# Patient Record
Sex: FEMALE | Race: WHITE | Employment: OTHER | URBAN - METROPOLITAN AREA
[De-identification: names, ages, dates, MRNs, and addresses within clinical notes are randomized per-mention and may not be internally consistent; named-entity substitution may affect disease eponyms.]

---

## 2017-03-09 PROBLEM — H61.23 BILATERAL IMPACTED CERUMEN: Status: ACTIVE | Noted: 2017-03-09

## 2017-03-09 PROBLEM — M25.511 ACUTE PAIN OF RIGHT SHOULDER: Status: ACTIVE | Noted: 2017-03-09

## 2017-04-19 PROBLEM — M81.0 OSTEOPOROSIS: Status: ACTIVE | Noted: 2017-04-19

## 2017-09-14 PROBLEM — D48.9 NEOPLASM OF UNCERTAIN BEHAVIOR: Status: ACTIVE | Noted: 2017-09-14

## 2017-10-17 PROBLEM — M79.671 RIGHT FOOT PAIN: Status: ACTIVE | Noted: 2017-10-17

## 2017-10-17 PROBLEM — M25.511 ACUTE PAIN OF RIGHT SHOULDER: Status: RESOLVED | Noted: 2017-03-09 | Resolved: 2017-10-17

## 2018-01-16 PROBLEM — R20.2 RIGHT HAND PARESTHESIA: Status: ACTIVE | Noted: 2018-01-16

## 2018-01-16 PROBLEM — M41.34 THORACOGENIC SCOLIOSIS OF THORACIC REGION: Status: ACTIVE | Noted: 2018-01-16

## 2018-02-02 ENCOUNTER — HOSPITAL ENCOUNTER (OUTPATIENT)
Dept: MAMMOGRAPHY | Age: 73
Discharge: HOME | End: 2018-02-02
Attending: OBSTETRICS & GYNECOLOGY
Payer: MEDICARE

## 2018-02-02 DIAGNOSIS — M81.0 OSTEOPOROSIS, UNSPECIFIED OSTEOPOROSIS TYPE, UNSPECIFIED PATHOLOGICAL FRACTURE PRESENCE: ICD-10-CM

## 2018-02-02 PROCEDURE — 77080 DXA BONE DENSITY AXIAL: CPT

## 2018-02-05 NOTE — PROGRESS NOTES
IN spite of all these years of evista and kickboxing she is still has osteoporosis in the L femur. I'd recommend stopping evista and getting her set up with Q6 mo prolia injections if she is agreeable. Please let me know, thanks.

## 2018-02-13 PROBLEM — M79.671 RIGHT FOOT PAIN: Status: RESOLVED | Noted: 2017-10-17 | Resolved: 2018-02-13

## 2018-02-13 PROBLEM — Z12.11 SCREEN FOR COLON CANCER: Status: ACTIVE | Noted: 2018-02-13

## 2018-04-16 ENCOUNTER — HOSPITAL ENCOUNTER (OUTPATIENT)
Dept: LAB | Age: 73
Discharge: HOME | End: 2018-04-16

## 2018-04-16 PROCEDURE — 88305 TISSUE EXAM BY PATHOLOGIST: CPT | Performed by: INTERNAL MEDICINE

## 2018-04-17 PROBLEM — Z00.00 ROUTINE GENERAL MEDICAL EXAMINATION AT A HEALTH CARE FACILITY: Status: ACTIVE | Noted: 2018-04-17

## 2018-04-17 PROBLEM — J41.0 SIMPLE CHRONIC BRONCHITIS (HCC): Status: ACTIVE | Noted: 2018-04-17

## 2018-04-17 PROBLEM — M15.9 PRIMARY OSTEOARTHRITIS INVOLVING MULTIPLE JOINTS: Status: ACTIVE | Noted: 2018-04-17

## 2018-10-17 PROBLEM — M25.552 LEFT HIP PAIN: Status: ACTIVE | Noted: 2018-10-17

## 2018-10-17 PROBLEM — R79.89 LOW VITAMIN D LEVEL: Status: ACTIVE | Noted: 2018-10-17

## 2018-10-22 ENCOUNTER — HOSPITAL ENCOUNTER (OUTPATIENT)
Dept: GENERAL RADIOLOGY | Age: 73
Discharge: HOME | End: 2018-10-22
Attending: FAMILY MEDICINE
Payer: MEDICARE

## 2018-10-22 DIAGNOSIS — M25.552 LEFT HIP PAIN: ICD-10-CM

## 2018-10-22 PROCEDURE — 73502 X-RAY EXAM HIP UNI 2-3 VIEWS: CPT

## 2018-11-15 PROBLEM — J30.9 ALLERGIC RHINITIS: Status: ACTIVE | Noted: 2018-11-15

## 2018-11-15 PROBLEM — B00.1 HERPES LABIALIS: Status: ACTIVE | Noted: 2018-11-15

## 2019-02-11 PROBLEM — J44.1 COPD EXACERBATION (HCC): Status: ACTIVE | Noted: 2019-02-11

## 2019-05-01 PROBLEM — S81.812A LACERATION OF LEFT LOWER EXTREMITY: Status: ACTIVE | Noted: 2019-05-01

## 2019-09-20 PROBLEM — L82.1 SEBORRHEIC KERATOSIS: Status: ACTIVE | Noted: 2019-09-20

## 2019-09-24 PROBLEM — Z12.11 SCREEN FOR COLON CANCER: Status: RESOLVED | Noted: 2018-02-13 | Resolved: 2019-09-24

## 2019-09-24 PROBLEM — Z00.00 ROUTINE GENERAL MEDICAL EXAMINATION AT A HEALTH CARE FACILITY: Status: RESOLVED | Noted: 2018-04-17 | Resolved: 2019-09-24

## 2019-10-30 PROBLEM — I10 ESSENTIAL HYPERTENSION: Status: ACTIVE | Noted: 2019-10-30

## 2020-07-22 PROBLEM — E55.9 VITAMIN D DEFICIENCY: Status: ACTIVE | Noted: 2018-10-17

## 2020-08-21 PROBLEM — Z00.00 ENCOUNTER FOR MEDICARE ANNUAL WELLNESS EXAM: Status: RESOLVED | Noted: 2018-04-17 | Resolved: 2020-08-21

## 2021-02-26 ENCOUNTER — HOSPITAL ENCOUNTER (OUTPATIENT)
Dept: MAMMOGRAPHY | Age: 76
Discharge: HOME | End: 2021-02-26
Attending: FAMILY MEDICINE
Payer: MEDICARE

## 2021-02-26 DIAGNOSIS — Z12.31 SCREENING MAMMOGRAM, ENCOUNTER FOR: ICD-10-CM

## 2021-02-26 PROCEDURE — 77067 SCR MAMMO BI INCL CAD: CPT

## 2021-05-06 PROBLEM — R91.8 PULMONARY NODULES: Status: ACTIVE | Noted: 2021-05-06

## 2021-05-06 PROBLEM — R53.82 CHRONIC FATIGUE: Status: ACTIVE | Noted: 2021-05-06

## 2021-05-14 ENCOUNTER — HOSPITAL ENCOUNTER (OUTPATIENT)
Dept: CT IMAGING | Age: 76
Discharge: HOME | End: 2021-05-14
Attending: FAMILY MEDICINE

## 2021-05-14 DIAGNOSIS — R91.8 PULMONARY NODULES: ICD-10-CM

## 2021-05-14 RX ORDER — SODIUM CHLORIDE 0.9 % (FLUSH) 0.9 %
10 SYRINGE (ML) INJECTION
Status: COMPLETED | OUTPATIENT
Start: 2021-05-14 | End: 2021-05-14

## 2021-05-14 RX ADMIN — Medication 10 ML: at 09:24

## 2021-08-03 PROBLEM — I10 HYPERTENSION: Status: RESOLVED | Noted: 2021-08-03 | Resolved: 2021-08-03

## 2022-03-18 PROBLEM — M81.0 OSTEOPOROSIS: Status: ACTIVE | Noted: 2017-04-19

## 2022-03-18 PROBLEM — J44.1 COPD EXACERBATION (HCC): Status: ACTIVE | Noted: 2019-02-11

## 2022-03-18 PROBLEM — R91.8 PULMONARY NODULES: Status: ACTIVE | Noted: 2021-05-06

## 2022-03-19 PROBLEM — R53.82 CHRONIC FATIGUE: Status: ACTIVE | Noted: 2021-05-06

## 2022-03-19 PROBLEM — M15.9 PRIMARY OSTEOARTHRITIS INVOLVING MULTIPLE JOINTS: Status: ACTIVE | Noted: 2018-04-17

## 2022-03-19 PROBLEM — M15.0 PRIMARY OSTEOARTHRITIS INVOLVING MULTIPLE JOINTS: Status: ACTIVE | Noted: 2018-04-17

## 2022-03-19 PROBLEM — L82.1 SEBORRHEIC KERATOSIS: Status: ACTIVE | Noted: 2019-09-20

## 2022-03-19 PROBLEM — S81.812A LACERATION OF LEFT LOWER EXTREMITY: Status: ACTIVE | Noted: 2019-05-01

## 2022-03-19 PROBLEM — R20.2 RIGHT HAND PARESTHESIA: Status: ACTIVE | Noted: 2018-01-16

## 2022-03-19 PROBLEM — I10 ESSENTIAL HYPERTENSION: Status: ACTIVE | Noted: 2019-10-30

## 2022-03-19 PROBLEM — B00.1 HERPES LABIALIS: Status: ACTIVE | Noted: 2018-11-15

## 2022-03-19 PROBLEM — D48.9 NEOPLASM OF UNCERTAIN BEHAVIOR: Status: ACTIVE | Noted: 2017-09-14

## 2022-03-19 PROBLEM — M25.552 LEFT HIP PAIN: Status: ACTIVE | Noted: 2018-10-17

## 2022-03-19 PROBLEM — H61.23 BILATERAL IMPACTED CERUMEN: Status: ACTIVE | Noted: 2017-03-09

## 2022-03-19 PROBLEM — J41.0 SIMPLE CHRONIC BRONCHITIS (HCC): Status: ACTIVE | Noted: 2018-04-17

## 2022-03-19 PROBLEM — J30.9 ALLERGIC RHINITIS: Status: ACTIVE | Noted: 2018-11-15

## 2022-03-19 PROBLEM — M41.34 THORACOGENIC SCOLIOSIS OF THORACIC REGION: Status: ACTIVE | Noted: 2018-01-16

## 2022-03-20 PROBLEM — E55.9 VITAMIN D DEFICIENCY: Status: ACTIVE | Noted: 2018-10-17

## 2023-08-28 ENCOUNTER — PROCEDURE VISIT (OUTPATIENT)
Dept: UROLOGY | Age: 78
End: 2023-08-28
Payer: COMMERCIAL

## 2023-08-28 DIAGNOSIS — C67.9 MALIGNANT NEOPLASM OF URINARY BLADDER, UNSPECIFIED SITE (HCC): Primary | ICD-10-CM

## 2023-08-28 LAB
BILIRUBIN, URINE, POC: NEGATIVE
BLOOD URINE, POC: NEGATIVE
GLUCOSE URINE, POC: NEGATIVE
KETONES, URINE, POC: NEGATIVE
LEUKOCYTE ESTERASE, URINE, POC: NORMAL
NITRITE, URINE, POC: NEGATIVE
PH, URINE, POC: 5.5 (ref 4.6–8)
PROTEIN,URINE, POC: NEGATIVE
SPECIFIC GRAVITY, URINE, POC: 1.01 (ref 1–1.03)
URINALYSIS CLARITY, POC: NORMAL
URINALYSIS COLOR, POC: NORMAL
UROBILINOGEN, POC: NORMAL

## 2023-08-28 PROCEDURE — 52000 CYSTOURETHROSCOPY: CPT | Performed by: UROLOGY

## 2023-08-28 PROCEDURE — 81003 URINALYSIS AUTO W/O SCOPE: CPT | Performed by: UROLOGY

## 2023-08-28 NOTE — PROGRESS NOTES
Smoking status: Every Day     Packs/day: 1.00     Types: Cigarettes    Smokeless tobacco: Never   Substance and Sexual Activity    Alcohol use: Yes     Alcohol/week: 1.7 standard drinks    Drug use: No    Sexual activity: Not on file   Other Topics Concern    Not on file   Social History Narrative    Lives with      Social Determinants of Health     Financial Resource Strain: Not on file   Food Insecurity: Not on file   Transportation Needs: Not on file   Physical Activity: Not on file   Stress: Not on file   Social Connections: Not on file   Intimate Partner Violence: Not on file   Housing Stability: Not on file     Family History   Problem Relation Age of Onset    Heart Disease Father     Cancer Maternal Aunt         breast    Cancer Other         breast    Emphysema Mother     Breast Cancer Maternal Aunt 64       There were no vitals taken for this visit. UA - Dipstick  Results for orders placed or performed in visit on 08/28/23   AMB POC URINALYSIS DIP STICK AUTO W/O MICRO   Result Value Ref Range    Color (UA POC)      Clarity (UA POC)      Glucose, Urine, POC Negative Negative    Bilirubin, Urine, POC Negative Negative    KETONES, Urine, POC Negative Negative    Specific Gravity, Urine, POC 1.015 1.001 - 1.035    Blood (UA POC) Negative Negative    pH, Urine, POC 5.5 4.6 - 8.0    Protein, Urine, POC Negative Negative    Urobilinogen, POC Normal     Nitrite, Urine, POC Negative Negative    Leukocyte Esterase, Urine, POC Trace Negative       UA - Micro  WBC - 0  RBC - 0  Bacteria - 0  Epith - 0      Cystoscopy Procedure:     Procedure Room  1  Scope ID:       disposable   Assistant:      kamilah    All risks, benefits and alternatives were again reviewed with patient and she is willing to proceed at this time. Her genital area was prepped and draped and a sterile field applied. 2% lidocaine jelly was injected in the the urethra and allowed to dwell for several minutes.   A flexible cystoscope was then

## 2024-10-10 ENCOUNTER — HOSPITAL ENCOUNTER (EMERGENCY)
Age: 79
Discharge: HOME | End: 2024-10-10
Attending: EMERGENCY MEDICINE
Payer: MEDICARE

## 2024-10-10 VITALS
TEMPERATURE: 98.5 F | HEART RATE: 81 BPM | RESPIRATION RATE: 16 BRPM | HEIGHT: 62 IN | OXYGEN SATURATION: 92 % | DIASTOLIC BLOOD PRESSURE: 61 MMHG | BODY MASS INDEX: 21.16 KG/M2 | SYSTOLIC BLOOD PRESSURE: 98 MMHG | WEIGHT: 115 LBS

## 2024-10-10 DIAGNOSIS — R53.83 FATIGUE, UNSPECIFIED TYPE: Primary | ICD-10-CM

## 2024-10-10 LAB
ALBUMIN SERPL-MCNC: 3.1 G/DL (ref 3.2–4.6)
ALBUMIN/GLOB SERPL: 0.9 (ref 1–1.9)
ALP SERPL-CCNC: 57 U/L (ref 35–104)
ALT SERPL-CCNC: 8 U/L (ref 8–45)
ANION GAP SERPL CALC-SCNC: 10 MMOL/L (ref 9–18)
APPEARANCE UR: CLEAR
AST SERPL-CCNC: 19 U/L (ref 15–37)
BASOPHILS # BLD: 0 K/UL (ref 0–0.2)
BASOPHILS NFR BLD: 1 % (ref 0–2)
BILIRUB SERPL-MCNC: <0.2 MG/DL (ref 0–1.2)
BILIRUB UR QL: NEGATIVE
BUN SERPL-MCNC: 35 MG/DL (ref 8–23)
CALCIUM SERPL-MCNC: 9 MG/DL (ref 8.8–10.2)
CHLORIDE SERPL-SCNC: 98 MMOL/L (ref 98–107)
CO2 SERPL-SCNC: 29 MMOL/L (ref 20–28)
COLOR UR: NORMAL
CREAT SERPL-MCNC: 1.26 MG/DL (ref 0.6–1.1)
DIFFERENTIAL METHOD BLD: ABNORMAL
EOSINOPHIL # BLD: 0.1 K/UL (ref 0–0.8)
EOSINOPHIL NFR BLD: 1 % (ref 0.5–7.8)
ERYTHROCYTE [DISTWIDTH] IN BLOOD BY AUTOMATED COUNT: 12.2 % (ref 11.9–14.6)
GLOBULIN SER CALC-MCNC: 3.3 G/DL (ref 2.3–3.5)
GLUCOSE SERPL-MCNC: 117 MG/DL (ref 70–99)
GLUCOSE UR STRIP.AUTO-MCNC: NEGATIVE MG/DL
HCT VFR BLD AUTO: 34.7 % (ref 35.8–46.3)
HGB BLD-MCNC: 10.8 G/DL (ref 11.7–15.4)
HGB UR QL STRIP: NEGATIVE
IMM GRANULOCYTES # BLD AUTO: 0 K/UL (ref 0–0.5)
IMM GRANULOCYTES NFR BLD AUTO: 0 % (ref 0–5)
KETONES UR QL STRIP.AUTO: NEGATIVE MG/DL
LEUKOCYTE ESTERASE UR QL STRIP.AUTO: NEGATIVE
LYMPHOCYTES # BLD: 0.8 K/UL (ref 0.5–4.6)
LYMPHOCYTES NFR BLD: 12 % (ref 13–44)
MAGNESIUM SERPL-MCNC: 2.4 MG/DL (ref 1.8–2.4)
MCH RBC QN AUTO: 30.3 PG (ref 26.1–32.9)
MCHC RBC AUTO-ENTMCNC: 31.1 G/DL (ref 31.4–35)
MCV RBC AUTO: 97.2 FL (ref 82–102)
MONOCYTES # BLD: 0.7 K/UL (ref 0.1–1.3)
MONOCYTES NFR BLD: 11 % (ref 4–12)
NEUTS SEG # BLD: 5 K/UL (ref 1.7–8.2)
NEUTS SEG NFR BLD: 76 % (ref 43–78)
NITRITE UR QL STRIP.AUTO: NEGATIVE
NRBC # BLD: 0 K/UL (ref 0–0.2)
PH UR STRIP: 5 (ref 5–9)
PLATELET # BLD AUTO: 390 K/UL (ref 150–450)
PMV BLD AUTO: 8.7 FL (ref 9.4–12.3)
POTASSIUM SERPL-SCNC: 4.5 MMOL/L (ref 3.5–5.1)
PROT SERPL-MCNC: 6.4 G/DL (ref 6.3–8.2)
PROT UR STRIP-MCNC: NEGATIVE MG/DL
RBC # BLD AUTO: 3.57 M/UL (ref 4.05–5.2)
SODIUM SERPL-SCNC: 137 MMOL/L (ref 136–145)
SP GR UR REFRACTOMETRY: 1.01 (ref 1–1.02)
UROBILINOGEN UR QL STRIP.AUTO: 0.2 EU/DL (ref 0.2–1)
WBC # BLD AUTO: 6.6 K/UL (ref 4.3–11.1)

## 2024-10-10 PROCEDURE — 80053 COMPREHEN METABOLIC PANEL: CPT

## 2024-10-10 PROCEDURE — 85025 COMPLETE CBC W/AUTO DIFF WBC: CPT

## 2024-10-10 PROCEDURE — 99284 EMERGENCY DEPT VISIT MOD MDM: CPT

## 2024-10-10 PROCEDURE — 83735 ASSAY OF MAGNESIUM: CPT

## 2024-10-10 PROCEDURE — 81003 URINALYSIS AUTO W/O SCOPE: CPT

## 2024-10-10 ASSESSMENT — PAIN - FUNCTIONAL ASSESSMENT: PAIN_FUNCTIONAL_ASSESSMENT: NONE - DENIES PAIN

## 2024-10-10 ASSESSMENT — LIFESTYLE VARIABLES
HOW MANY STANDARD DRINKS CONTAINING ALCOHOL DO YOU HAVE ON A TYPICAL DAY: PATIENT DOES NOT DRINK
HOW OFTEN DO YOU HAVE A DRINK CONTAINING ALCOHOL: NEVER

## 2024-10-10 ASSESSMENT — ENCOUNTER SYMPTOMS
SHORTNESS OF BREATH: 0
NAUSEA: 0
VOMITING: 0
COUGH: 0

## 2024-10-10 NOTE — DISCHARGE INSTRUCTIONS
As we discussed, did not find the exact cause of your symptoms today in the Emergency Department but we did not find anything urgent or emergent.  Therefore, I will encourage you to follow-up with a primary care doctor for reevaluation.    Please return with any fevers, vomiting, coughing, difficulty breathing, worsening symptoms, or additional concerns.    To help you get a primary care doctor for follow-up after your emergency department visit, please call [phone] between 7AM - 6PM Monday to Friday.  A care navigator will be able to assist you with setting up a doctor close to your home.

## 2024-10-10 NOTE — ED PROVIDER NOTES
18 mmol/L    Glucose 117 (H) 70 - 99 mg/dL    BUN 35 (H) 8 - 23 MG/DL    Creatinine 1.26 (H) 0.60 - 1.10 MG/DL    Est, Glom Filt Rate 44 (L) >60 ml/min/1.73m2    Calcium 9.0 8.8 - 10.2 MG/DL    Total Bilirubin <0.2 0.0 - 1.2 MG/DL    ALT 8 8 - 45 U/L    AST 19 15 - 37 U/L    Alk Phosphatase 57 35 - 104 U/L    Total Protein 6.4 6.3 - 8.2 g/dL    Albumin 3.1 (L) 3.2 - 4.6 g/dL    Globulin 3.3 2.3 - 3.5 g/dL    Albumin/Globulin Ratio 0.9 (L) 1.0 - 1.9     Magnesium   Result Value Ref Range    Magnesium 2.4 1.8 - 2.4 mg/dL   Urinalysis   Result Value Ref Range    Color, UA YELLOW/STRAW      Appearance CLEAR      Specific Gravity, UA 1.013 1.001 - 1.023      pH, Urine 5.0 5.0 - 9.0      Protein, UA Negative NEG mg/dL    Glucose, Ur Negative NEG mg/dL    Ketones, Urine Negative NEG mg/dL    Bilirubin, Urine Negative NEG      Blood, Urine Negative NEG      Urobilinogen, Urine 0.2 0.2 - 1.0 EU/dL    Nitrite, Urine Negative NEG      Leukocyte Esterase, Urine Negative NEG           No orders to display                No results for input(s): \"COVID19\" in the last 72 hours.    Voice dictation software was used during the making of this note.  This software is not perfect and grammatical and other typographical errors may be present.  This note has not been completely proofread for errors.        Jackson Samayoa MD  10/10/24 4773

## 2024-10-10 NOTE — DISCHARGE INSTR - COC
List:716259073}    Routes of Feeding: {CHP DME Other Feedings:449466813}  Liquids: {Slp liquid thickness:84623}  Daily Fluid Restriction: {CHP DME Yes amt example:372982175}  Last Modified Barium Swallow with Video (Video Swallowing Test): {Done Not Done Date:}    Treatments at the Time of Hospital Discharge:   Respiratory Treatments: ***  Oxygen Therapy:  {Therapy; copd oxygen:03194}  Ventilator:    { CC Vent List:911249416}    Rehab Therapies: {THERAPEUTIC INTERVENTION:0555058755}  Weight Bearing Status/Restrictions: { CC Weight Bearin}  Other Medical Equipment (for information only, NOT a DME order):  {EQUIPMENT:623643003}  Other Treatments: ***    Patient's personal belongings (please select all that are sent with patient):  {CHP DME Belongings:299711007}    RN SIGNATURE:  {Esignature:289851626}    CASE MANAGEMENT/SOCIAL WORK SECTION    Inpatient Status Date: ***    Readmission Risk Assessment Score:  Readmission Risk              Risk of Unplanned Readmission:  0           Discharging to Facility/ Agency   Name:   Address:  Phone:  Fax:    Dialysis Facility (if applicable)   Name:  Address:  Dialysis Schedule:  Phone:  Fax:    / signature: {Esignature:494737500}    PHYSICIAN SECTION    Prognosis: {Prognosis:1450599905}    Condition at Discharge: { Patient Condition:566776031}    Rehab Potential (if transferring to Rehab): {Prognosis:5819043025}    Recommended Labs or Other Treatments After Discharge: ***    Physician Certification: I certify the above information and transfer of Madai Tinoco  is necessary for the continuing treatment of the diagnosis listed and that she requires {Admit to Appropriate Level of Care:50111} for {GREATER/LESS:220783500} 30 days.     Update Admission H&P: {CHP DME Changes in HandP:264965513}    PHYSICIAN SIGNATURE:  {Esignature:307964796}

## 2024-10-21 ENCOUNTER — APPOINTMENT (OUTPATIENT)
Dept: GENERAL RADIOLOGY | Age: 79
DRG: 378 | End: 2024-10-21
Payer: MEDICARE

## 2024-10-21 ENCOUNTER — HOSPITAL ENCOUNTER (INPATIENT)
Age: 79
LOS: 3 days | Discharge: HOME | DRG: 378 | End: 2024-10-24
Attending: EMERGENCY MEDICINE | Admitting: FAMILY MEDICINE
Payer: MEDICARE

## 2024-10-21 DIAGNOSIS — K92.2 GASTROINTESTINAL HEMORRHAGE, UNSPECIFIED GASTROINTESTINAL HEMORRHAGE TYPE: Primary | ICD-10-CM

## 2024-10-21 PROBLEM — I48.0 PAROXYSMAL ATRIAL FIBRILLATION (HCC): Status: ACTIVE | Noted: 2023-03-01

## 2024-10-21 PROBLEM — F32.A DEPRESSION: Status: ACTIVE | Noted: 2023-01-19

## 2024-10-21 PROBLEM — G47.33 OSA (OBSTRUCTIVE SLEEP APNEA): Status: ACTIVE | Noted: 2024-09-20

## 2024-10-21 PROBLEM — J43.2 CENTRILOBULAR EMPHYSEMA (HCC): Status: ACTIVE | Noted: 2021-07-13

## 2024-10-21 LAB
ALBUMIN SERPL-MCNC: 3.2 G/DL (ref 3.2–4.6)
ALBUMIN/GLOB SERPL: 1.2 (ref 1–1.9)
ALP SERPL-CCNC: 43 U/L (ref 35–104)
ALT SERPL-CCNC: 8 U/L (ref 8–45)
ANION GAP SERPL CALC-SCNC: 12 MMOL/L (ref 9–18)
AST SERPL-CCNC: 19 U/L (ref 15–37)
BASOPHILS # BLD: 0 K/UL (ref 0–0.2)
BASOPHILS NFR BLD: 0 % (ref 0–2)
BILIRUB SERPL-MCNC: <0.2 MG/DL (ref 0–1.2)
BUN SERPL-MCNC: 59 MG/DL (ref 8–23)
CALCIUM SERPL-MCNC: 9 MG/DL (ref 8.8–10.2)
CHLORIDE SERPL-SCNC: 100 MMOL/L (ref 98–107)
CO2 SERPL-SCNC: 26 MMOL/L (ref 20–28)
CREAT SERPL-MCNC: 1.32 MG/DL (ref 0.6–1.1)
CRP SERPL HS-MCNC: 1.3 MG/L (ref 0–3)
DIFFERENTIAL METHOD BLD: ABNORMAL
EOSINOPHIL # BLD: 0.1 K/UL (ref 0–0.8)
EOSINOPHIL NFR BLD: 1 % (ref 0.5–7.8)
ERYTHROCYTE [DISTWIDTH] IN BLOOD BY AUTOMATED COUNT: 13.5 % (ref 11.9–14.6)
GLOBULIN SER CALC-MCNC: 2.7 G/DL (ref 2.3–3.5)
GLUCOSE SERPL-MCNC: 130 MG/DL (ref 70–99)
HCT VFR BLD AUTO: 20.7 % (ref 35.8–46.3)
HGB BLD-MCNC: 6.6 G/DL (ref 11.7–15.4)
HISTORY CHECK: NORMAL
IMM GRANULOCYTES # BLD AUTO: 0 K/UL (ref 0–0.5)
IMM GRANULOCYTES NFR BLD AUTO: 0 % (ref 0–5)
INR PPP: 1.2
LYMPHOCYTES # BLD: 1.2 K/UL (ref 0.5–4.6)
LYMPHOCYTES NFR BLD: 12 % (ref 13–44)
MAGNESIUM SERPL-MCNC: 2.4 MG/DL (ref 1.8–2.4)
MCH RBC QN AUTO: 31.1 PG (ref 26.1–32.9)
MCHC RBC AUTO-ENTMCNC: 31.9 G/DL (ref 31.4–35)
MCV RBC AUTO: 97.6 FL (ref 82–102)
MONOCYTES # BLD: 0.7 K/UL (ref 0.1–1.3)
MONOCYTES NFR BLD: 7 % (ref 4–12)
NEUTS SEG # BLD: 8 K/UL (ref 1.7–8.2)
NEUTS SEG NFR BLD: 79 % (ref 43–78)
NRBC # BLD: 0 K/UL (ref 0–0.2)
NT PRO BNP: 1801 PG/ML (ref 0–450)
PLATELET # BLD AUTO: 381 K/UL (ref 150–450)
PMV BLD AUTO: 9.3 FL (ref 9.4–12.3)
POTASSIUM SERPL-SCNC: 4.5 MMOL/L (ref 3.5–5.1)
PROCALCITONIN SERPL-MCNC: 0.06 NG/ML (ref 0–0.1)
PROT SERPL-MCNC: 5.9 G/DL (ref 6.3–8.2)
PROTHROMBIN TIME: 14.8 SEC (ref 11.3–14.9)
RBC # BLD AUTO: 2.12 M/UL (ref 4.05–5.2)
SARS-COV-2 RDRP RESP QL NAA+PROBE: NOT DETECTED
SODIUM SERPL-SCNC: 138 MMOL/L (ref 136–145)
SOURCE: NORMAL
TROPONIN T SERPL HS-MCNC: 64 NG/L (ref 0–14)
WBC # BLD AUTO: 10.1 K/UL (ref 4.3–11.1)

## 2024-10-21 PROCEDURE — 1100000000 HC RM PRIVATE

## 2024-10-21 PROCEDURE — 85610 PROTHROMBIN TIME: CPT

## 2024-10-21 PROCEDURE — 93005 ELECTROCARDIOGRAM TRACING: CPT | Performed by: STUDENT IN AN ORGANIZED HEALTH CARE EDUCATION/TRAINING PROGRAM

## 2024-10-21 PROCEDURE — 85025 COMPLETE CBC W/AUTO DIFF WBC: CPT

## 2024-10-21 PROCEDURE — 86923 COMPATIBILITY TEST ELECTRIC: CPT

## 2024-10-21 PROCEDURE — 96375 TX/PRO/DX INJ NEW DRUG ADDON: CPT

## 2024-10-21 PROCEDURE — 83880 ASSAY OF NATRIURETIC PEPTIDE: CPT

## 2024-10-21 PROCEDURE — 80053 COMPREHEN METABOLIC PANEL: CPT

## 2024-10-21 PROCEDURE — 86850 RBC ANTIBODY SCREEN: CPT

## 2024-10-21 PROCEDURE — 94640 AIRWAY INHALATION TREATMENT: CPT

## 2024-10-21 PROCEDURE — 6370000000 HC RX 637 (ALT 250 FOR IP): Performed by: STUDENT IN AN ORGANIZED HEALTH CARE EDUCATION/TRAINING PROGRAM

## 2024-10-21 PROCEDURE — 71045 X-RAY EXAM CHEST 1 VIEW: CPT

## 2024-10-21 PROCEDURE — 2580000003 HC RX 258: Performed by: STUDENT IN AN ORGANIZED HEALTH CARE EDUCATION/TRAINING PROGRAM

## 2024-10-21 PROCEDURE — 6360000002 HC RX W HCPCS: Performed by: STUDENT IN AN ORGANIZED HEALTH CARE EDUCATION/TRAINING PROGRAM

## 2024-10-21 PROCEDURE — 36430 TRANSFUSION BLD/BLD COMPNT: CPT

## 2024-10-21 PROCEDURE — P9016 RBC LEUKOCYTES REDUCED: HCPCS

## 2024-10-21 PROCEDURE — 84484 ASSAY OF TROPONIN QUANT: CPT

## 2024-10-21 PROCEDURE — 94761 N-INVAS EAR/PLS OXIMETRY MLT: CPT

## 2024-10-21 PROCEDURE — 86901 BLOOD TYPING SEROLOGIC RH(D): CPT

## 2024-10-21 PROCEDURE — 84145 PROCALCITONIN (PCT): CPT

## 2024-10-21 PROCEDURE — 99285 EMERGENCY DEPT VISIT HI MDM: CPT

## 2024-10-21 PROCEDURE — 96374 THER/PROPH/DIAG INJ IV PUSH: CPT

## 2024-10-21 PROCEDURE — 86141 C-REACTIVE PROTEIN HS: CPT

## 2024-10-21 PROCEDURE — 2700000000 HC OXYGEN THERAPY PER DAY

## 2024-10-21 PROCEDURE — 83735 ASSAY OF MAGNESIUM: CPT

## 2024-10-21 PROCEDURE — 87635 SARS-COV-2 COVID-19 AMP PRB: CPT

## 2024-10-21 PROCEDURE — 86900 BLOOD TYPING SEROLOGIC ABO: CPT

## 2024-10-21 RX ORDER — ESCITALOPRAM OXALATE 10 MG/1
10 TABLET ORAL DAILY
COMMUNITY

## 2024-10-21 RX ORDER — HYDROCHLOROTHIAZIDE 25 MG/1
25 TABLET ORAL EVERY EVENING
COMMUNITY

## 2024-10-21 RX ORDER — SODIUM CHLORIDE 9 MG/ML
INJECTION, SOLUTION INTRAVENOUS PRN
Status: DISCONTINUED | OUTPATIENT
Start: 2024-10-21 | End: 2024-10-24 | Stop reason: HOSPADM

## 2024-10-21 RX ORDER — ROPINIROLE 0.25 MG/1
0.25 TABLET, FILM COATED ORAL NIGHTLY
COMMUNITY
Start: 2024-08-23

## 2024-10-21 RX ORDER — ONDANSETRON 2 MG/ML
4 INJECTION INTRAMUSCULAR; INTRAVENOUS
Status: COMPLETED | OUTPATIENT
Start: 2024-10-21 | End: 2024-10-21

## 2024-10-21 RX ORDER — IPRATROPIUM BROMIDE AND ALBUTEROL SULFATE 2.5; .5 MG/3ML; MG/3ML
1 SOLUTION RESPIRATORY (INHALATION)
Status: COMPLETED | OUTPATIENT
Start: 2024-10-21 | End: 2024-10-21

## 2024-10-21 RX ORDER — APIXABAN 5 MG/1
5 TABLET, FILM COATED ORAL 2 TIMES DAILY
Status: ON HOLD | COMMUNITY
End: 2024-10-24 | Stop reason: HOSPADM

## 2024-10-21 RX ORDER — DEXAMETHASONE SODIUM PHOSPHATE 10 MG/ML
10 INJECTION INTRAMUSCULAR; INTRAVENOUS ONCE
Status: COMPLETED | OUTPATIENT
Start: 2024-10-21 | End: 2024-10-21

## 2024-10-21 RX ADMIN — ONDANSETRON 4 MG: 2 INJECTION INTRAMUSCULAR; INTRAVENOUS at 22:16

## 2024-10-21 RX ADMIN — PANTOPRAZOLE SODIUM 80 MG: 40 INJECTION, POWDER, FOR SOLUTION INTRAVENOUS at 22:16

## 2024-10-21 RX ADMIN — DEXAMETHASONE SODIUM PHOSPHATE 10 MG: 10 INJECTION INTRAMUSCULAR; INTRAVENOUS at 21:04

## 2024-10-21 RX ADMIN — IPRATROPIUM BROMIDE AND ALBUTEROL SULFATE 1 DOSE: 2.5; .5 SOLUTION RESPIRATORY (INHALATION) at 19:34

## 2024-10-21 ASSESSMENT — ENCOUNTER SYMPTOMS
ABDOMINAL PAIN: 0
COUGH: 1
VOMITING: 0
TROUBLE SWALLOWING: 0
FACIAL SWELLING: 0
SHORTNESS OF BREATH: 1
PHOTOPHOBIA: 0

## 2024-10-21 ASSESSMENT — PAIN - FUNCTIONAL ASSESSMENT: PAIN_FUNCTIONAL_ASSESSMENT: NONE - DENIES PAIN

## 2024-10-21 NOTE — ED PROVIDER NOTES
Emergency Department Provider Note       PCP: Wan Salas MD   Age: 79 y.o.   Sex: female     DISPOSITION Decision To Admit 10/21/2024 10:07:26 PM    ICD-10-CM    1. Gastrointestinal hemorrhage, unspecified gastrointestinal hemorrhage type  K92.2           Medical Decision Making     In summary this is a 79-year-old female patient who presented for evaluation with concerns for shortness of breath and generalized fatigue and weakness.  She was found to have a significant decrease in hemoglobin with positive guaiac result and melena concerning for active GI bleed.  She is currently on Eliquis.  We will give her blood here in the ED and admit.  She will need colonoscopy and endoscopy most likely.  Stable at time of admission.  Patient history, ROS, physical exam, labs, radiological workup and all pertinent findings were discussed with attending Dr. Florence.   ED Course as of 10/21/24 2231   Mon Oct 21, 2024   2141 BUN increasing with rapidly decreasing hemoglobin concerning for acute bleed.  Will set up a rectal exam [NR]   2202 Guaiac result strongly positive with black melena. Patient caregiver states she is on eliquis [NR]   2205 10:05 PM  Consulted Dr. Jacob Corbin of GI regarding patient suspected GI bleed [NR]   2206 10:06 PM  Consulted Dr. Paulette uGerin of hospitalist service regarding admission [NR]      ED Course User Index  [NR] Yoel Sauceda PA     1 or more acute illnesses that pose a threat to life or bodily function.   Drug therapy given requiring intensive monitoring for toxicity.  Discussion with external consultants.  Chronic medical problems impacting care include chronic anticoagulation.  Shared medical decision making was utilized in creating the patients health plan today.  I independently ordered and reviewed each unique test.       The patients assessment required an independent historian: Patient caregiver.  The reason they were needed is important historical information not provided by

## 2024-10-21 NOTE — ED TRIAGE NOTES
Patient reports increased SOB x 2 days. Patient reports increased fatigue and cough over last few days.

## 2024-10-22 ENCOUNTER — ANESTHESIA EVENT (OUTPATIENT)
Dept: ENDOSCOPY | Age: 79
DRG: 378 | End: 2024-10-22
Payer: MEDICARE

## 2024-10-22 PROBLEM — D64.9 ANEMIA: Status: ACTIVE | Noted: 2024-10-21

## 2024-10-22 LAB
ANION GAP SERPL CALC-SCNC: 14 MMOL/L (ref 9–18)
BASOPHILS # BLD: 0 K/UL (ref 0–0.2)
BASOPHILS NFR BLD: 0 % (ref 0–2)
BUN SERPL-MCNC: 57 MG/DL (ref 8–23)
CALCIUM SERPL-MCNC: 9.1 MG/DL (ref 8.8–10.2)
CHLORIDE SERPL-SCNC: 99 MMOL/L (ref 98–107)
CO2 SERPL-SCNC: 24 MMOL/L (ref 20–28)
CREAT SERPL-MCNC: 1.38 MG/DL (ref 0.6–1.1)
DIFFERENTIAL METHOD BLD: ABNORMAL
EKG ATRIAL RATE: 89 BPM
EKG DIAGNOSIS: NORMAL
EKG P AXIS: 82 DEGREES
EKG P-R INTERVAL: 211 MS
EKG Q-T INTERVAL: 418 MS
EKG QRS DURATION: 128 MS
EKG QTC CALCULATION (BAZETT): 512 MS
EKG R AXIS: -29 DEGREES
EKG T AXIS: 75 DEGREES
EKG VENTRICULAR RATE: 90 BPM
EOSINOPHIL # BLD: 0 K/UL (ref 0–0.8)
EOSINOPHIL NFR BLD: 0 % (ref 0.5–7.8)
ERYTHROCYTE [DISTWIDTH] IN BLOOD BY AUTOMATED COUNT: 14.3 % (ref 11.9–14.6)
GLUCOSE SERPL-MCNC: 187 MG/DL (ref 70–99)
HCT VFR BLD AUTO: 24.9 % (ref 35.8–46.3)
HCT VFR BLD AUTO: 26.8 % (ref 35.8–46.3)
HGB BLD-MCNC: 8.1 G/DL (ref 11.7–15.4)
HGB BLD-MCNC: 8.6 G/DL (ref 11.7–15.4)
IMM GRANULOCYTES # BLD AUTO: 0.1 K/UL (ref 0–0.5)
IMM GRANULOCYTES NFR BLD AUTO: 1 % (ref 0–5)
LYMPHOCYTES # BLD: 0.4 K/UL (ref 0.5–4.6)
LYMPHOCYTES NFR BLD: 4 % (ref 13–44)
MCH RBC QN AUTO: 30.6 PG (ref 26.1–32.9)
MCHC RBC AUTO-ENTMCNC: 32.1 G/DL (ref 31.4–35)
MCV RBC AUTO: 95.4 FL (ref 82–102)
MONOCYTES # BLD: 0 K/UL (ref 0.1–1.3)
MONOCYTES NFR BLD: 0 % (ref 4–12)
NEUTS SEG # BLD: 8.8 K/UL (ref 1.7–8.2)
NEUTS SEG NFR BLD: 95 % (ref 43–78)
NRBC # BLD: 0 K/UL (ref 0–0.2)
PLATELET # BLD AUTO: 335 K/UL (ref 150–450)
PMV BLD AUTO: 9.6 FL (ref 9.4–12.3)
POTASSIUM SERPL-SCNC: 4.5 MMOL/L (ref 3.5–5.1)
RBC # BLD AUTO: 2.81 M/UL (ref 4.05–5.2)
SODIUM SERPL-SCNC: 137 MMOL/L (ref 136–145)
WBC # BLD AUTO: 9.2 K/UL (ref 4.3–11.1)

## 2024-10-22 PROCEDURE — 2580000003 HC RX 258: Performed by: FAMILY MEDICINE

## 2024-10-22 PROCEDURE — 94640 AIRWAY INHALATION TREATMENT: CPT

## 2024-10-22 PROCEDURE — 6360000002 HC RX W HCPCS: Performed by: FAMILY MEDICINE

## 2024-10-22 PROCEDURE — 6360000002 HC RX W HCPCS: Performed by: INTERNAL MEDICINE

## 2024-10-22 PROCEDURE — 2700000000 HC OXYGEN THERAPY PER DAY

## 2024-10-22 PROCEDURE — 94760 N-INVAS EAR/PLS OXIMETRY 1: CPT

## 2024-10-22 PROCEDURE — 85014 HEMATOCRIT: CPT

## 2024-10-22 PROCEDURE — 80048 BASIC METABOLIC PNL TOTAL CA: CPT

## 2024-10-22 PROCEDURE — 85018 HEMOGLOBIN: CPT

## 2024-10-22 PROCEDURE — 6370000000 HC RX 637 (ALT 250 FOR IP)

## 2024-10-22 PROCEDURE — 93010 ELECTROCARDIOGRAM REPORT: CPT | Performed by: INTERNAL MEDICINE

## 2024-10-22 PROCEDURE — 94761 N-INVAS EAR/PLS OXIMETRY MLT: CPT

## 2024-10-22 PROCEDURE — 99222 1ST HOSP IP/OBS MODERATE 55: CPT

## 2024-10-22 PROCEDURE — 85025 COMPLETE CBC W/AUTO DIFF WBC: CPT

## 2024-10-22 PROCEDURE — 6370000000 HC RX 637 (ALT 250 FOR IP): Performed by: FAMILY MEDICINE

## 2024-10-22 PROCEDURE — 36415 COLL VENOUS BLD VENIPUNCTURE: CPT

## 2024-10-22 PROCEDURE — 1100000003 HC PRIVATE W/ TELEMETRY

## 2024-10-22 RX ORDER — ONDANSETRON 4 MG/1
4 TABLET, ORALLY DISINTEGRATING ORAL EVERY 8 HOURS PRN
Status: DISCONTINUED | OUTPATIENT
Start: 2024-10-22 | End: 2024-10-24 | Stop reason: HOSPADM

## 2024-10-22 RX ORDER — REVEFENACIN 175 UG/3ML
175 SOLUTION RESPIRATORY (INHALATION) DAILY
COMMUNITY
Start: 2024-08-12

## 2024-10-22 RX ORDER — SODIUM CHLORIDE 9 MG/ML
INJECTION, SOLUTION INTRAVENOUS PRN
Status: CANCELLED | OUTPATIENT
Start: 2024-10-22

## 2024-10-22 RX ORDER — ARFORMOTEROL TARTRATE 15 UG/2ML
15 SOLUTION RESPIRATORY (INHALATION) 2 TIMES DAILY
COMMUNITY

## 2024-10-22 RX ORDER — MULTIVIT WITH MINERALS/LUTEIN
250 TABLET ORAL DAILY
COMMUNITY

## 2024-10-22 RX ORDER — ONDANSETRON 2 MG/ML
4 INJECTION INTRAMUSCULAR; INTRAVENOUS EVERY 6 HOURS PRN
Status: DISCONTINUED | OUTPATIENT
Start: 2024-10-22 | End: 2024-10-24 | Stop reason: HOSPADM

## 2024-10-22 RX ORDER — SODIUM CHLORIDE 0.9 % (FLUSH) 0.9 %
5-40 SYRINGE (ML) INJECTION EVERY 12 HOURS SCHEDULED
Status: DISCONTINUED | OUTPATIENT
Start: 2024-10-22 | End: 2024-10-24 | Stop reason: HOSPADM

## 2024-10-22 RX ORDER — POLYETHYLENE GLYCOL 3350 17 G/17G
17 POWDER, FOR SOLUTION ORAL DAILY PRN
Status: DISCONTINUED | OUTPATIENT
Start: 2024-10-22 | End: 2024-10-24 | Stop reason: HOSPADM

## 2024-10-22 RX ORDER — HYDROCHLOROTHIAZIDE 25 MG/1
25 TABLET ORAL DAILY
Status: DISCONTINUED | OUTPATIENT
Start: 2024-10-22 | End: 2024-10-24 | Stop reason: HOSPADM

## 2024-10-22 RX ORDER — ESCITALOPRAM OXALATE 10 MG/1
10 TABLET ORAL DAILY
Status: DISCONTINUED | OUTPATIENT
Start: 2024-10-22 | End: 2024-10-24 | Stop reason: HOSPADM

## 2024-10-22 RX ORDER — LISINOPRIL 5 MG/1
5 TABLET ORAL DAILY
Status: DISCONTINUED | OUTPATIENT
Start: 2024-10-22 | End: 2024-10-24 | Stop reason: HOSPADM

## 2024-10-22 RX ORDER — SODIUM CHLORIDE 0.9 % (FLUSH) 0.9 %
5-40 SYRINGE (ML) INJECTION PRN
Status: DISCONTINUED | OUTPATIENT
Start: 2024-10-22 | End: 2024-10-24 | Stop reason: HOSPADM

## 2024-10-22 RX ORDER — BUDESONIDE 0.5 MG/2ML
0.5 INHALANT ORAL
Status: DISCONTINUED | OUTPATIENT
Start: 2024-10-22 | End: 2024-10-24 | Stop reason: HOSPADM

## 2024-10-22 RX ORDER — ACETAMINOPHEN 650 MG/1
650 SUPPOSITORY RECTAL EVERY 6 HOURS PRN
Status: DISCONTINUED | OUTPATIENT
Start: 2024-10-22 | End: 2024-10-24 | Stop reason: HOSPADM

## 2024-10-22 RX ORDER — SODIUM CHLORIDE 9 MG/ML
INJECTION, SOLUTION INTRAVENOUS PRN
Status: DISCONTINUED | OUTPATIENT
Start: 2024-10-22 | End: 2024-10-24 | Stop reason: HOSPADM

## 2024-10-22 RX ORDER — PNV NO.95/FERROUS FUM/FOLIC AC 28MG-0.8MG
65 TABLET ORAL 2 TIMES DAILY
COMMUNITY

## 2024-10-22 RX ORDER — ROPINIROLE 0.25 MG/1
0.25 TABLET, FILM COATED ORAL NIGHTLY
Status: DISCONTINUED | OUTPATIENT
Start: 2024-10-22 | End: 2024-10-24 | Stop reason: HOSPADM

## 2024-10-22 RX ORDER — SODIUM CHLORIDE 0.9 % (FLUSH) 0.9 %
5-40 SYRINGE (ML) INJECTION EVERY 12 HOURS SCHEDULED
Status: CANCELLED | OUTPATIENT
Start: 2024-10-22

## 2024-10-22 RX ORDER — ACETAMINOPHEN 160 MG
2000 TABLET,DISINTEGRATING ORAL DAILY
COMMUNITY

## 2024-10-22 RX ORDER — SODIUM CHLORIDE, SODIUM LACTATE, POTASSIUM CHLORIDE, CALCIUM CHLORIDE 600; 310; 30; 20 MG/100ML; MG/100ML; MG/100ML; MG/100ML
INJECTION, SOLUTION INTRAVENOUS CONTINUOUS
Status: CANCELLED | OUTPATIENT
Start: 2024-10-22

## 2024-10-22 RX ORDER — ARFORMOTEROL TARTRATE 15 UG/2ML
15 SOLUTION RESPIRATORY (INHALATION)
Status: DISCONTINUED | OUTPATIENT
Start: 2024-10-22 | End: 2024-10-24 | Stop reason: HOSPADM

## 2024-10-22 RX ORDER — BUDESONIDE AND FORMOTEROL FUMARATE DIHYDRATE 160; 4.5 UG/1; UG/1
2 AEROSOL RESPIRATORY (INHALATION) 2 TIMES DAILY
Status: DISCONTINUED | OUTPATIENT
Start: 2024-10-22 | End: 2024-10-22

## 2024-10-22 RX ORDER — ACETAMINOPHEN 325 MG/1
650 TABLET ORAL EVERY 6 HOURS PRN
Status: DISCONTINUED | OUTPATIENT
Start: 2024-10-22 | End: 2024-10-24 | Stop reason: HOSPADM

## 2024-10-22 RX ORDER — ALBUTEROL SULFATE 0.83 MG/ML
2.5 SOLUTION RESPIRATORY (INHALATION) EVERY 4 HOURS PRN
Status: DISCONTINUED | OUTPATIENT
Start: 2024-10-22 | End: 2024-10-24 | Stop reason: HOSPADM

## 2024-10-22 RX ADMIN — HYDROCHLOROTHIAZIDE 25 MG: 25 TABLET ORAL at 08:53

## 2024-10-22 RX ADMIN — SODIUM CHLORIDE, PRESERVATIVE FREE 10 ML: 5 INJECTION INTRAVENOUS at 08:59

## 2024-10-22 RX ADMIN — PANTOPRAZOLE SODIUM 40 MG: 40 INJECTION, POWDER, FOR SOLUTION INTRAVENOUS at 08:53

## 2024-10-22 RX ADMIN — ESCITALOPRAM OXALATE 10 MG: 10 TABLET ORAL at 08:53

## 2024-10-22 RX ADMIN — SODIUM CHLORIDE, PRESERVATIVE FREE 10 ML: 5 INJECTION INTRAVENOUS at 21:31

## 2024-10-22 RX ADMIN — ROPINIROLE HYDROCHLORIDE 0.25 MG: 0.25 TABLET, FILM COATED ORAL at 01:51

## 2024-10-22 RX ADMIN — ARFORMOTEROL TARTRATE 15 MCG: 15 SOLUTION RESPIRATORY (INHALATION) at 20:29

## 2024-10-22 RX ADMIN — ROPINIROLE HYDROCHLORIDE 0.25 MG: 0.25 TABLET, FILM COATED ORAL at 21:31

## 2024-10-22 RX ADMIN — BUDESONIDE 500 MCG: 0.5 SUSPENSION RESPIRATORY (INHALATION) at 20:29

## 2024-10-22 RX ADMIN — POLYETHYLENE GLYCOL-3350 AND ELECTROLYTES 4000 ML: 236; 6.74; 5.86; 2.97; 22.74 POWDER, FOR SOLUTION ORAL at 18:22

## 2024-10-22 ASSESSMENT — ENCOUNTER SYMPTOMS
CONSTIPATION: 0
DIARRHEA: 0
BLOOD IN STOOL: 0
ABDOMINAL DISTENTION: 1
ABDOMINAL PAIN: 0
ANAL BLEEDING: 0
NAUSEA: 1
TROUBLE SWALLOWING: 0
VOMITING: 0
SHORTNESS OF BREATH: 1

## 2024-10-22 NOTE — PLAN OF CARE
Problem: Discharge Planning  Goal: Discharge to home or other facility with appropriate resources  10/22/2024 1145 by Collin Batista RN  Outcome: Progressing  10/22/2024 0321 by Dav Simons RN  Outcome: Progressing     Problem: ABCDS Injury Assessment  Goal: Absence of physical injury  10/22/2024 1145 by Collin Batista RN  Outcome: Progressing  10/22/2024 0321 by Dav Simons RN  Outcome: Adequate for Discharge     Problem: Safety - Adult  Goal: Free from fall injury  10/22/2024 1145 by Collin Batista RN  Outcome: Progressing  10/22/2024 0321 by Dav Simons RN  Outcome: Adequate for Discharge     Problem: Skin/Tissue Integrity  Goal: Absence of new skin breakdown  Description: 1.  Monitor for areas of redness and/or skin breakdown  2.  Assess vascular access sites hourly  3.  Every 4-6 hours minimum:  Change oxygen saturation probe site  4.  Every 4-6 hours:  If on nasal continuous positive airway pressure, respiratory therapy assess nares and determine need for appliance change or resting period.  10/22/2024 1145 by Collin Batista RN  Outcome: Progressing  10/22/2024 0321 by Dav Simons RN  Outcome: Adequate for Discharge

## 2024-10-22 NOTE — PLAN OF CARE
Problem: Discharge Planning  Goal: Discharge to home or other facility with appropriate resources  Outcome: Progressing     Problem: ABCDS Injury Assessment  Goal: Absence of physical injury  Outcome: Adequate for Discharge     Problem: Safety - Adult  Goal: Free from fall injury  Outcome: Adequate for Discharge     Problem: Skin/Tissue Integrity  Goal: Absence of new skin breakdown  Description: 1.  Monitor for areas of redness and/or skin breakdown  2.  Assess vascular access sites hourly  3.  Every 4-6 hours minimum:  Change oxygen saturation probe site  4.  Every 4-6 hours:  If on nasal continuous positive airway pressure, respiratory therapy assess nares and determine need for appliance change or resting period.  Outcome: Adequate for Discharge

## 2024-10-22 NOTE — ASSESSMENT & PLAN NOTE
- Currently receiving transfusion for hgb of 6.6  - Recheck post-transfusion hemogram.  Transfuse to keep hgb>7  - GI consulted  - Holding home eliquis  - CLD for now

## 2024-10-22 NOTE — CONSENT
Informed Consent for Blood Component Transfusion Note    I have discussed with the patient the rationale for blood component transfusion; its benefits in treating or preventing fatigue, organ damage, or death; and its risk which includes mild transfusion reactions, rare risk of blood borne infection, or more serious but rare reactions. I have discussed the alternatives to transfusion, including the risk and consequences of not receiving transfusion. The patient had an opportunity to ask questions and had agreed to proceed with transfusion of blood components.    Electronically signed by KALEY SHANKS on 10/21/24 at 9:26 PM EDT

## 2024-10-22 NOTE — ED NOTES
TRANSFER - OUT REPORT:    Verbal report given to VONDA Demarco on Madai Tinoco  being transferred to [facility] for routine progression of patient care       Report consisted of patient's Situation, Background, Assessment and   Recommendations(SBAR).     Information from the following report(s) Nurse Handoff Report was reviewed with the receiving nurse.    Lines:   Peripheral IV 10/21/24 Left Antecubital (Active)        Opportunity for questions and clarification was provided.      Patient transported with:  Registered Nurse

## 2024-10-22 NOTE — CARE COORDINATION
Admitted with GI bleed. Assessment completed with patient at bedside. Granddaughter also present. Patient lives with spouse at address on file. Independent prior to admission per report. DME includes home oxygen: 1L with exertion during the day, 2L HS. Has concentrator and portable tanks. Provider is [facility]. No current  services. No previous SNF. Anticipate routine DC with OP FU when medically stable.     HGB 6.6 on admit. Received transfusion. GI consulted. Eliquis on hold for scope in 48 hours from 10/21.     10/22/24 1343   Service Assessment   Patient Orientation Alert and Oriented;Person;Place;Situation   Cognition Alert   History Provided By Patient;Other (see comment)  (granddaughter at bedside)   Primary Caregiver Spouse   Accompanied By/Relationship granddaughter   Support Systems Spouse/Significant Other;Family Members   Patient's Healthcare Decision Maker is: Legal Next of Kin   PCP Verified by CM Yes   Prior Functional Level Independent in ADLs/IADLs   Current Functional Level Independent in ADLs/IADLs   Can patient return to prior living arrangement Yes   Ability to make needs known: Good   Family able to assist with home care needs: Yes   Would you like for me to discuss the discharge plan with any other family members/significant others, and if so, who? No   Financial Resources Medicare   Community Resources None   CM/SW Referral Other (see comment)  (Discharge planning)   Social/Functional History   Lives With Spouse   Type of Home House   Home Layout One level   Home Access Stairs to enter without rails   Entrance Stairs - Number of Steps 1   Receives Help From Family   ADL Assistance Independent   Homemaking Assistance Independent   Homemaking Responsibilities Yes   Ambulation Assistance Independent   Transfer Assistance Independent   Occupation Retired   Discharge Planning   Type of Residence House   Living Arrangements Spouse/Significant Other   Current Services Prior To

## 2024-10-22 NOTE — ACP (ADVANCE CARE PLANNING)
Advance Care Planning   Healthcare Decision Maker:    Primary Decision Maker: Cole Tinoco - [facility] - [phone]    Patient is a full code.     Click here to complete Healthcare Decision Makers including selection of the Healthcare Decision Maker Relationship (ie \"Primary\").

## 2024-10-22 NOTE — H&P
Hospitalist History and Physical   Admit Date:  10/21/2024  7:13 PM   Name:  Madai Tinoco   Age:  79 y.o.  Sex:  female  :  1945   MRN:  [id number]     Presenting Complaint: shortness of breath, fatigue  Reason(s) for Admission: GI bleed [K92.2]  Gastrointestinal hemorrhage, unspecified gastrointestinal hemorrhage type [K92.2]     History of Present Illness:   Madai Tinoco is a 79 y.o. female with medical history of emphysema, HTN, afib on eliquis, YOLY who presented to ED with shortness of breath and fatigue.  Symptoms worsening over the past several days.  Upon ER evaluation, pBNP is 1,801.  Trop is 64.  Hgb is 6.6.  CXR shows:  IMPRESSION:  Interstitial edema pattern.  GI consulted, as patient is on eliquis, recommended holding this with plan for scope in 48 hours.  Patient is getting transfusion currently.  Hospitalist to admit.    Review of Systems:  10 systems reviewed and negative except as noted in HPI.  Assessment & Plan:   * GI bleed  Assessment & Plan  - Currently receiving transfusion for hgb of 6.6  - Recheck post-transfusion hemogram.  Transfuse to keep hgb>7  - GI consulted  - Holding home eliquis  - CLD for now    Centrilobular emphysema (HCC)  Assessment & Plan  - Not in current exacerbation  - Continue home meds    YOLY (obstructive sleep apnea)  Assessment & Plan  - CPAP QHS    Paroxysmal atrial fibrillation (HCC)  Assessment & Plan  - Currently rate-controlled  - Holding home eliquis due to GIB    Essential hypertension  Assessment & Plan  - BP stable  - Continue home meds    Bladder cancer (HCC)  Assessment & Plan  - Remote history        Disposition: inpatient      Past medical history reviewed.    Past Medical History:   Diagnosis Date    Bladder cancer (HCC)     followed by Dr. Downey    Breast cancer (HCC)     s/p lumpectomy, experimental radiation for 5 days    COPD (chronic obstructive pulmonary disease) (HCC)     Hypertension     Osteoporosis

## 2024-10-23 ENCOUNTER — ANESTHESIA (OUTPATIENT)
Dept: ENDOSCOPY | Age: 79
DRG: 378 | End: 2024-10-23
Payer: MEDICARE

## 2024-10-23 LAB
ANION GAP SERPL CALC-SCNC: 10 MMOL/L (ref 9–18)
BASOPHILS # BLD: 0 K/UL (ref 0–0.2)
BASOPHILS NFR BLD: 0 % (ref 0–2)
BUN SERPL-MCNC: 52 MG/DL (ref 8–23)
CALCIUM SERPL-MCNC: 8.6 MG/DL (ref 8.8–10.2)
CHLORIDE SERPL-SCNC: 101 MMOL/L (ref 98–107)
CO2 SERPL-SCNC: 28 MMOL/L (ref 20–28)
CREAT SERPL-MCNC: 1.18 MG/DL (ref 0.6–1.1)
DIFFERENTIAL METHOD BLD: ABNORMAL
EOSINOPHIL # BLD: 0.1 K/UL (ref 0–0.8)
EOSINOPHIL NFR BLD: 1 % (ref 0.5–7.8)
ERYTHROCYTE [DISTWIDTH] IN BLOOD BY AUTOMATED COUNT: 15 % (ref 11.9–14.6)
GLUCOSE SERPL-MCNC: 98 MG/DL (ref 70–99)
HCT VFR BLD AUTO: 20.9 % (ref 35.8–46.3)
HCT VFR BLD AUTO: 26.3 % (ref 35.8–46.3)
HGB BLD-MCNC: 6.9 G/DL (ref 11.7–15.4)
HGB BLD-MCNC: 8.8 G/DL (ref 11.7–15.4)
HISTORY CHECK: NORMAL
IMM GRANULOCYTES # BLD AUTO: 0 K/UL (ref 0–0.5)
IMM GRANULOCYTES NFR BLD AUTO: 1 % (ref 0–5)
LYMPHOCYTES # BLD: 1.1 K/UL (ref 0.5–4.6)
LYMPHOCYTES NFR BLD: 15 % (ref 13–44)
MCH RBC QN AUTO: 31.1 PG (ref 26.1–32.9)
MCHC RBC AUTO-ENTMCNC: 33 G/DL (ref 31.4–35)
MCV RBC AUTO: 94.1 FL (ref 82–102)
MONOCYTES # BLD: 0.7 K/UL (ref 0.1–1.3)
MONOCYTES NFR BLD: 9 % (ref 4–12)
NEUTS SEG # BLD: 5.3 K/UL (ref 1.7–8.2)
NEUTS SEG NFR BLD: 73 % (ref 43–78)
NRBC # BLD: 0 K/UL (ref 0–0.2)
PLATELET # BLD AUTO: 270 K/UL (ref 150–450)
PMV BLD AUTO: 9.5 FL (ref 9.4–12.3)
POTASSIUM SERPL-SCNC: 4 MMOL/L (ref 3.5–5.1)
RBC # BLD AUTO: 2.22 M/UL (ref 4.05–5.2)
SODIUM SERPL-SCNC: 139 MMOL/L (ref 136–145)
WBC # BLD AUTO: 7.2 K/UL (ref 4.3–11.1)

## 2024-10-23 PROCEDURE — 7100000011 HC PHASE II RECOVERY - ADDTL 15 MIN: Performed by: INTERNAL MEDICINE

## 2024-10-23 PROCEDURE — 30233N1 TRANSFUSION OF NONAUTOLOGOUS RED BLOOD CELLS INTO PERIPHERAL VEIN, PERCUTANEOUS APPROACH: ICD-10-PCS | Performed by: INTERNAL MEDICINE

## 2024-10-23 PROCEDURE — 85025 COMPLETE CBC W/AUTO DIFF WBC: CPT

## 2024-10-23 PROCEDURE — 80048 BASIC METABOLIC PNL TOTAL CA: CPT

## 2024-10-23 PROCEDURE — 2709999900 HC NON-CHARGEABLE SUPPLY: Performed by: INTERNAL MEDICINE

## 2024-10-23 PROCEDURE — 94640 AIRWAY INHALATION TREATMENT: CPT

## 2024-10-23 PROCEDURE — 6370000000 HC RX 637 (ALT 250 FOR IP): Performed by: FAMILY MEDICINE

## 2024-10-23 PROCEDURE — 2700000000 HC OXYGEN THERAPY PER DAY

## 2024-10-23 PROCEDURE — 3609027000 HC COLONOSCOPY: Performed by: INTERNAL MEDICINE

## 2024-10-23 PROCEDURE — 2060000000 HC ICU INTERMEDIATE R&B

## 2024-10-23 PROCEDURE — 2580000003 HC RX 258: Performed by: FAMILY MEDICINE

## 2024-10-23 PROCEDURE — 2500000003 HC RX 250 WO HCPCS: Performed by: NURSE ANESTHETIST, CERTIFIED REGISTERED

## 2024-10-23 PROCEDURE — 3700000000 HC ANESTHESIA ATTENDED CARE: Performed by: INTERNAL MEDICINE

## 2024-10-23 PROCEDURE — 3700000001 HC ADD 15 MINUTES (ANESTHESIA): Performed by: INTERNAL MEDICINE

## 2024-10-23 PROCEDURE — 36430 TRANSFUSION BLD/BLD COMPNT: CPT

## 2024-10-23 PROCEDURE — 0DJD8ZZ INSPECTION OF LOWER INTESTINAL TRACT, VIA NATURAL OR ARTIFICIAL OPENING ENDOSCOPIC: ICD-10-PCS | Performed by: INTERNAL MEDICINE

## 2024-10-23 PROCEDURE — 6360000002 HC RX W HCPCS: Performed by: NURSE ANESTHETIST, CERTIFIED REGISTERED

## 2024-10-23 PROCEDURE — 94761 N-INVAS EAR/PLS OXIMETRY MLT: CPT

## 2024-10-23 PROCEDURE — 6360000002 HC RX W HCPCS: Performed by: FAMILY MEDICINE

## 2024-10-23 PROCEDURE — 36415 COLL VENOUS BLD VENIPUNCTURE: CPT

## 2024-10-23 PROCEDURE — 45378 DIAGNOSTIC COLONOSCOPY: CPT | Performed by: INTERNAL MEDICINE

## 2024-10-23 PROCEDURE — P9016 RBC LEUKOCYTES REDUCED: HCPCS

## 2024-10-23 PROCEDURE — 7100000010 HC PHASE II RECOVERY - FIRST 15 MIN: Performed by: INTERNAL MEDICINE

## 2024-10-23 PROCEDURE — 85014 HEMATOCRIT: CPT

## 2024-10-23 PROCEDURE — 99223 1ST HOSP IP/OBS HIGH 75: CPT | Performed by: INTERNAL MEDICINE

## 2024-10-23 PROCEDURE — 6360000002 HC RX W HCPCS: Performed by: INTERNAL MEDICINE

## 2024-10-23 PROCEDURE — 2580000003 HC RX 258: Performed by: NURSE ANESTHETIST, CERTIFIED REGISTERED

## 2024-10-23 PROCEDURE — 85018 HEMOGLOBIN: CPT

## 2024-10-23 RX ORDER — SODIUM CHLORIDE 9 MG/ML
INJECTION, SOLUTION INTRAVENOUS PRN
Status: DISCONTINUED | OUTPATIENT
Start: 2024-10-23 | End: 2024-10-23 | Stop reason: HOSPADM

## 2024-10-23 RX ORDER — PROPOFOL 10 MG/ML
INJECTION, EMULSION INTRAVENOUS
Status: DISCONTINUED | OUTPATIENT
Start: 2024-10-23 | End: 2024-10-23 | Stop reason: SDUPTHER

## 2024-10-23 RX ORDER — ONDANSETRON 2 MG/ML
4 INJECTION INTRAMUSCULAR; INTRAVENOUS
Status: DISCONTINUED | OUTPATIENT
Start: 2024-10-23 | End: 2024-10-23 | Stop reason: HOSPADM

## 2024-10-23 RX ORDER — LIDOCAINE HYDROCHLORIDE 10 MG/ML
1 INJECTION, SOLUTION INFILTRATION; PERINEURAL
Status: DISCONTINUED | OUTPATIENT
Start: 2024-10-23 | End: 2024-10-23 | Stop reason: HOSPADM

## 2024-10-23 RX ORDER — NALOXONE HYDROCHLORIDE 0.4 MG/ML
INJECTION, SOLUTION INTRAMUSCULAR; INTRAVENOUS; SUBCUTANEOUS PRN
Status: DISCONTINUED | OUTPATIENT
Start: 2024-10-23 | End: 2024-10-23 | Stop reason: HOSPADM

## 2024-10-23 RX ORDER — LIDOCAINE HYDROCHLORIDE 20 MG/ML
INJECTION, SOLUTION EPIDURAL; INFILTRATION; INTRACAUDAL; PERINEURAL
Status: DISCONTINUED | OUTPATIENT
Start: 2024-10-23 | End: 2024-10-23 | Stop reason: SDUPTHER

## 2024-10-23 RX ORDER — SODIUM CHLORIDE 0.9 % (FLUSH) 0.9 %
5-40 SYRINGE (ML) INJECTION PRN
Status: DISCONTINUED | OUTPATIENT
Start: 2024-10-23 | End: 2024-10-23 | Stop reason: HOSPADM

## 2024-10-23 RX ORDER — SODIUM CHLORIDE 9 MG/ML
INJECTION, SOLUTION INTRAVENOUS PRN
Status: DISCONTINUED | OUTPATIENT
Start: 2024-10-23 | End: 2024-10-24 | Stop reason: HOSPADM

## 2024-10-23 RX ORDER — SODIUM CHLORIDE 0.9 % (FLUSH) 0.9 %
SYRINGE (ML) INJECTION
Status: DISCONTINUED | OUTPATIENT
Start: 2024-10-23 | End: 2024-10-23 | Stop reason: SDUPTHER

## 2024-10-23 RX ORDER — SODIUM CHLORIDE 0.9 % (FLUSH) 0.9 %
5-40 SYRINGE (ML) INJECTION EVERY 12 HOURS SCHEDULED
Status: DISCONTINUED | OUTPATIENT
Start: 2024-10-23 | End: 2024-10-23 | Stop reason: HOSPADM

## 2024-10-23 RX ADMIN — SODIUM CHLORIDE, PRESERVATIVE FREE 10 ML: 5 INJECTION INTRAVENOUS at 09:05

## 2024-10-23 RX ADMIN — PHENYLEPHRINE HYDROCHLORIDE 100 MCG: 0.1 INJECTION, SOLUTION INTRAVENOUS at 08:51

## 2024-10-23 RX ADMIN — SODIUM CHLORIDE, PRESERVATIVE FREE 10 ML: 5 INJECTION INTRAVENOUS at 20:45

## 2024-10-23 RX ADMIN — PHENYLEPHRINE HYDROCHLORIDE 100 MCG: 0.1 INJECTION, SOLUTION INTRAVENOUS at 08:41

## 2024-10-23 RX ADMIN — HYDROCHLOROTHIAZIDE 25 MG: 25 TABLET ORAL at 11:24

## 2024-10-23 RX ADMIN — BUDESONIDE 500 MCG: 0.5 SUSPENSION RESPIRATORY (INHALATION) at 19:47

## 2024-10-23 RX ADMIN — PANTOPRAZOLE SODIUM 40 MG: 40 INJECTION, POWDER, FOR SOLUTION INTRAVENOUS at 11:30

## 2024-10-23 RX ADMIN — PROPOFOL 100 MCG/KG/MIN: 10 INJECTION, EMULSION INTRAVENOUS at 08:34

## 2024-10-23 RX ADMIN — ROPINIROLE HYDROCHLORIDE 0.25 MG: 0.25 TABLET, FILM COATED ORAL at 20:45

## 2024-10-23 RX ADMIN — ARFORMOTEROL TARTRATE 15 MCG: 15 SOLUTION RESPIRATORY (INHALATION) at 19:47

## 2024-10-23 RX ADMIN — SODIUM CHLORIDE, PRESERVATIVE FREE 10 ML: 5 INJECTION INTRAVENOUS at 11:15

## 2024-10-23 RX ADMIN — ESCITALOPRAM OXALATE 10 MG: 10 TABLET ORAL at 11:13

## 2024-10-23 RX ADMIN — PHENYLEPHRINE HYDROCHLORIDE 100 MCG: 0.1 INJECTION, SOLUTION INTRAVENOUS at 08:48

## 2024-10-23 RX ADMIN — LIDOCAINE HYDROCHLORIDE 100 MG: 20 INJECTION, SOLUTION EPIDURAL; INFILTRATION; INTRACAUDAL; PERINEURAL at 08:34

## 2024-10-23 ASSESSMENT — PAIN SCALES - GENERAL: PAINLEVEL_OUTOF10: 0

## 2024-10-23 ASSESSMENT — COPD QUESTIONNAIRES: CAT_SEVERITY: SEVERE

## 2024-10-23 ASSESSMENT — PAIN - FUNCTIONAL ASSESSMENT: PAIN_FUNCTIONAL_ASSESSMENT: 0-10

## 2024-10-23 NOTE — PERIOP NOTE
TRANSFER - IN REPORT:    Verbal report received from VONDA Demarco on Madai Garza Nowradha  being received from Med-Surg for ordered procedure      Report consisted of patient's Situation, Background, Assessment and   Recommendations(SBAR).     Information from the following report(s) Nurse Handoff Report was reviewed with the receiving nurse.    Opportunity for questions and clarification was provided.      Assessment completed upon patient's arrival to unit and care assumed.

## 2024-10-23 NOTE — ANESTHESIA PRE PROCEDURE
\"PREGTESTUR\", \"PREGSERUM\", \"HCG\", \"HCGQUANT\"     ABGs: No results found for: \"PHART\", \"PO2ART\", \"JDN0RVS\", \"MEQ9XNI\", \"BEART\", \"H1FSERAZ\"     Type & Screen (If Applicable):  Lab Results   Component Value Date    ABORH A POSITIVE 10/21/2024    LABANTI NEG 10/21/2024       Drug/Infectious Status (If Applicable):  No results found for: \"HIV\", \"HEPCAB\"    COVID-19 Screening (If Applicable):   Lab Results   Component Value Date/Time    COVID19 Not detected 10/21/2024 09:03 PM           Anesthesia Evaluation  Patient summary reviewed and Nursing notes reviewed  Airway: Mallampati: II  TM distance: >3 FB   Neck ROM: full  Mouth opening: > = 3 FB   Dental:    (+) upper dentures      Pulmonary: breath sounds clear to auscultation  (+)  COPD (1-2 L O2NC continuous, daily nebulizers): severe,    sleep apnea:                                 ROS comment: Former smoker  Mod pulm HTN   Cardiovascular:  Exercise tolerance: poor (<4 METS)  (+) hypertension:, dysrhythmias (s/p AVN ablation/pacemaker (Eliquis held since admission)): atrial fibrillation        Rhythm: regular  Rate: normal                 ROS comment: TTE 2022 - normal EF, mild AS (mean grad 8, peak grad 13), mod MR, mod TR  RVSP 52     Neuro/Psych:   (+) depression/anxiety             GI/Hepatic/Renal: Neg GI/Hepatic/Renal ROS            Endo/Other:    (+) blood dyscrasia (Hgb 6.9 this am): anemia:..                 Abdominal:             Vascular: negative vascular ROS.         Other Findings:       Anesthesia Plan      TIVA     ASA 3     (Transfuse additional unit of PRBC for Hgb 6.9)  Induction: intravenous.      Anesthetic plan and risks discussed with patient.    Use of blood products discussed with patient whom consented to blood products.                  ROLANDO FARAH MD   10/23/2024

## 2024-10-23 NOTE — PERIOP NOTE
TRANSFER - OUT REPORT:    Verbal report given to Gene FERRARI on Madai Tinoco  being transferred to [facility] for routine progression of patient care       Report consisted of patient's Situation, Background, Assessment and   Recommendations(SBAR).     Information from the following report(s) Nurse Handoff Report was reviewed with the receiving nurse.    Lines:   Peripheral IV 10/21/24 Left Antecubital (Active)   Site Assessment Clean, dry & intact 10/23/24 0921   Line Status Normal saline locked 10/23/24 0921   Line Care Connections checked and tightened;Cap changed 10/22/24 1850   Phlebitis Assessment No symptoms 10/23/24 0921   Infiltration Assessment 0 10/23/24 0921   Alcohol Cap Used Yes 10/23/24 0921   Dressing Status Clean, dry & intact 10/23/24 0921   Dressing Type Transparent 10/23/24 0921        Opportunity for questions and clarification was provided.      Patient transported with:  O2 @ 3lpm

## 2024-10-23 NOTE — CONSULTS
Consult Note            Date:10/22/2024        Patient Name:Madai Tinoco     YOB: 1945     Age:79 y.o.    Inpatient consult to GI  Consult performed by: Sarah Matamoros PA  Consult ordered by: Paulette Guerin MD        I have personally discussed the patient's management with the GI PA.  I have fully participated in the care of this patient and have provided the substantive portion including the entire medical decision making (MDM) for this split/shared patient encounter. I have personally reviewed all pertinent clinical information, including history, physical exam, and plan. I have personally reviewed and approved all orders. I agree with the note as written unless otherwise specified below.     Review of Systems: A complete 10-point ROS was conducted - all pertinent positives are per the HPI/Subjective portion of this note, all others are negative.     Thank you for involving the GI service in the care of your patient. Please dont hesitate to call me directly on my cell below with any questions, updates, etc.      -Jacob Corbin MD  [phone]   Gastroenterology/Hepatology    Chief Complaint     Chief Complaint   Patient presents with    Shortness of Breath        History Obtained From   patient    History of Present Illness   Patient is a 79 year old female, with a hx of HTN, AFIB, emphysema, breast CA, who was admitted for SOB/fatigue and found to have a Hgb of 6.6 from 10.8 on 10/10). GI consulted for further evaluation. Patient recently admitted at [facility] for evaluation of anemia and possible melena; EGD on 9/23/24 showing gastric inflammation and small hiatal hernia (cannot find path); recommended outpatient colonoscopy +/- capsule.    Patient reports ongoing weakness/ SOB for past few weeks. She is unsure of any blood BM; her last BM was yesterday AM and was brown. She reports some nausea and bloating, but no abdominal pain, vomiting, weight loss, SAHARA, dysphagia. No known hx 
cancer, permanent AF s/p DC PM + AVNA 3/2023.     Underwent dual-chamber pacer with AV node ablation March 2023. Has done well since then but reports continued fatigue and sleepiness. Feels like she can take a nap a few hours after waking up.    Device interrogation dual-chamber pacemaker estimated longevity 11.4 years ventricular pacing 100%, no underlying rhythm at VVI 40, stable threshold less than 1 V, A-fib 98.3%. Currently in sinus rhythm.    Ancillary data  ECG today atrial fibrillation  bpm    Echo done on 9/25/2022 -- normal ventricular function, moderate left atrial dilation, moderate MR, moderate TR    PFTS: Spirometry with severe obstruction (FEV1/FVC ratio 38%; FEV1 0.89L, 48%).     Assessment & Plan  1. Paroxysmal atrial fibrillation  2. Chronic hypoxic respiratory failure  3. COPD  Currently doing well after dual-chamber pacemaker implant with AV node ablation. Reports some fatigue and sleepiness. Will stop diltiazem as this is unnecessary at this point. Otherwise stable pacemaker check today. Will continue remote checks every 3 months. Continue Eliquis 5 twice daily indefinitely. Follow-up 1 year.       Past Medical History:   Diagnosis Date    Bladder cancer (HCC)     followed by Dr. Downey    Breast cancer (HCC) 2004    s/p lumpectomy, experimental radiation for 5 days    COPD (chronic obstructive pulmonary disease) (HCC)     Hypertension     Osteoporosis       Past Surgical History:   Procedure Laterality Date    BREAST SURGERY      lumpectomy    CHOLECYSTECTOMY      GYN      ovary removal    LAP,CHOLECYSTECTOMY      OVARY REMOVAL      unsure of which ovary > 30 years ago    TRANSURETHRAL RESEC BLADDER NECK        Allergies   Allergen Reactions    Sulfa Antibiotics Swelling     Social History     Tobacco Use    Smoking status: Former     Current packs/day: 1.00     Types: Cigarettes    Smokeless tobacco: Never   Substance Use Topics    Alcohol use: Not Currently     Alcohol/week: 1.7

## 2024-10-23 NOTE — ANESTHESIA POSTPROCEDURE EVALUATION
Department of Anesthesiology  Postprocedure Note    Patient: Madai Tinoco  MRN: [id number]  YOB: 1945  Date of evaluation: 10/23/2024    Procedure Summary       Date: 10/23/24 Room / Location: [facility] [place] [facility] / [facility] [place]    Anesthesia Start: 0823 Anesthesia Stop: 0918    Procedure: COLONOSCOPY DIAGNOSTIC Diagnosis:       Anemia      (Anemia [D64.9])    Surgeons: Jacob Corbin MD Responsible Provider: Natalio Bermudez MD    Anesthesia Type: TIVA ASA Status: 3            Anesthesia Type: TIVA    Jett Phase I:      Jett Phase II: Jett Score: 9    Anesthesia Post Evaluation    Patient location during evaluation: PACU  Patient participation: complete - patient participated  Level of consciousness: awake and alert  Airway patency: patent  Nausea: well controlled.  Cardiovascular status: acceptable.  Respiratory status: acceptable  Hydration status: stable  Pain management: adequate    No notable events documented.

## 2024-10-24 VITALS
WEIGHT: 115 LBS | TEMPERATURE: 98.6 F | RESPIRATION RATE: 18 BRPM | DIASTOLIC BLOOD PRESSURE: 62 MMHG | SYSTOLIC BLOOD PRESSURE: 110 MMHG | HEIGHT: 63 IN | HEART RATE: 71 BPM | BODY MASS INDEX: 20.38 KG/M2 | OXYGEN SATURATION: 94 %

## 2024-10-24 LAB
ABO + RH BLD: NORMAL
ANION GAP SERPL CALC-SCNC: 8 MMOL/L (ref 9–18)
BASOPHILS # BLD: 0 K/UL (ref 0–0.2)
BASOPHILS NFR BLD: 0 % (ref 0–2)
BLD PROD TYP BPU: NORMAL
BLD PROD TYP BPU: NORMAL
BLOOD BANK BLOOD PRODUCT EXPIRATION DATE: NORMAL
BLOOD BANK BLOOD PRODUCT EXPIRATION DATE: NORMAL
BLOOD BANK CMNT PATIENT-IMP: NORMAL
BLOOD BANK DISPENSE STATUS: NORMAL
BLOOD BANK DISPENSE STATUS: NORMAL
BLOOD BANK ISBT PRODUCT BLOOD TYPE: 6200
BLOOD BANK ISBT PRODUCT BLOOD TYPE: 6200
BLOOD BANK PRODUCT CODE: NORMAL
BLOOD BANK UNIT TYPE AND RH: NORMAL
BLOOD BANK UNIT TYPE AND RH: NORMAL
BLOOD GROUP ANTIBODIES SERPL: NORMAL
BPU ID: NORMAL
BPU ID: NORMAL
BUN SERPL-MCNC: 39 MG/DL (ref 8–23)
CALCIUM SERPL-MCNC: 9 MG/DL (ref 8.8–10.2)
CHLORIDE SERPL-SCNC: 103 MMOL/L (ref 98–107)
CO2 SERPL-SCNC: 30 MMOL/L (ref 20–28)
CREAT SERPL-MCNC: 1.05 MG/DL (ref 0.6–1.1)
CROSSMATCH RESULT: NORMAL
CROSSMATCH RESULT: NORMAL
DIFFERENTIAL METHOD BLD: ABNORMAL
EOSINOPHIL # BLD: 0.2 K/UL (ref 0–0.8)
EOSINOPHIL NFR BLD: 3 % (ref 0.5–7.8)
ERYTHROCYTE [DISTWIDTH] IN BLOOD BY AUTOMATED COUNT: 16 % (ref 11.9–14.6)
GLUCOSE SERPL-MCNC: 98 MG/DL (ref 70–99)
HCT VFR BLD AUTO: 27.6 % (ref 35.8–46.3)
HGB BLD-MCNC: 8.9 G/DL (ref 11.7–15.4)
IMM GRANULOCYTES # BLD AUTO: 0 K/UL (ref 0–0.5)
IMM GRANULOCYTES NFR BLD AUTO: 1 % (ref 0–5)
LYMPHOCYTES # BLD: 1 K/UL (ref 0.5–4.6)
LYMPHOCYTES NFR BLD: 15 % (ref 13–44)
MCH RBC QN AUTO: 30.3 PG (ref 26.1–32.9)
MCHC RBC AUTO-ENTMCNC: 32.2 G/DL (ref 31.4–35)
MCV RBC AUTO: 93.9 FL (ref 82–102)
MONOCYTES # BLD: 0.8 K/UL (ref 0.1–1.3)
MONOCYTES NFR BLD: 12 % (ref 4–12)
NEUTS SEG # BLD: 4.4 K/UL (ref 1.7–8.2)
NEUTS SEG NFR BLD: 69 % (ref 43–78)
NRBC # BLD: 0 K/UL (ref 0–0.2)
PLATELET # BLD AUTO: 291 K/UL (ref 150–450)
PMV BLD AUTO: 9.4 FL (ref 9.4–12.3)
POTASSIUM SERPL-SCNC: 4.5 MMOL/L (ref 3.5–5.1)
RBC # BLD AUTO: 2.94 M/UL (ref 4.05–5.2)
SODIUM SERPL-SCNC: 141 MMOL/L (ref 136–145)
SPECIMEN EXP DATE BLD: NORMAL
UNIT DIVISION: 0
UNIT DIVISION: 0
UNIT ISSUE DATE/TIME: NORMAL
UNIT ISSUE DATE/TIME: NORMAL
WBC # BLD AUTO: 6.3 K/UL (ref 4.3–11.1)

## 2024-10-24 PROCEDURE — 2580000003 HC RX 258: Performed by: FAMILY MEDICINE

## 2024-10-24 PROCEDURE — 85025 COMPLETE CBC W/AUTO DIFF WBC: CPT

## 2024-10-24 PROCEDURE — 2700000000 HC OXYGEN THERAPY PER DAY

## 2024-10-24 PROCEDURE — 6360000002 HC RX W HCPCS: Performed by: FAMILY MEDICINE

## 2024-10-24 PROCEDURE — 94640 AIRWAY INHALATION TREATMENT: CPT

## 2024-10-24 PROCEDURE — 80048 BASIC METABOLIC PNL TOTAL CA: CPT

## 2024-10-24 PROCEDURE — 6370000000 HC RX 637 (ALT 250 FOR IP): Performed by: FAMILY MEDICINE

## 2024-10-24 PROCEDURE — 36415 COLL VENOUS BLD VENIPUNCTURE: CPT

## 2024-10-24 PROCEDURE — 6360000002 HC RX W HCPCS: Performed by: INTERNAL MEDICINE

## 2024-10-24 RX ORDER — LISINOPRIL 5 MG/1
5 TABLET ORAL DAILY
Qty: 30 TABLET | Refills: 0
Start: 2024-10-24 | End: 2024-10-24

## 2024-10-24 RX ORDER — LISINOPRIL 5 MG/1
40 TABLET ORAL DAILY
Qty: 30 TABLET | Refills: 0
Start: 2024-10-24

## 2024-10-24 RX ORDER — PANTOPRAZOLE SODIUM 40 MG/1
40 TABLET, DELAYED RELEASE ORAL DAILY
Qty: 30 TABLET | Refills: 0 | Status: SHIPPED | OUTPATIENT
Start: 2024-10-24 | End: 2024-11-23

## 2024-10-24 RX ADMIN — SODIUM CHLORIDE, PRESERVATIVE FREE 10 ML: 5 INJECTION INTRAVENOUS at 09:34

## 2024-10-24 RX ADMIN — ARFORMOTEROL TARTRATE 15 MCG: 15 SOLUTION RESPIRATORY (INHALATION) at 12:25

## 2024-10-24 RX ADMIN — HYDROCHLOROTHIAZIDE 25 MG: 25 TABLET ORAL at 09:33

## 2024-10-24 RX ADMIN — BUDESONIDE 500 MCG: 0.5 SUSPENSION RESPIRATORY (INHALATION) at 12:25

## 2024-10-24 RX ADMIN — PANTOPRAZOLE SODIUM 40 MG: 40 INJECTION, POWDER, FOR SOLUTION INTRAVENOUS at 09:34

## 2024-10-24 RX ADMIN — ESCITALOPRAM OXALATE 10 MG: 10 TABLET ORAL at 09:33

## 2024-10-24 ASSESSMENT — PAIN SCALES - GENERAL: PAINLEVEL_OUTOF10: 0

## 2024-10-24 NOTE — DISCHARGE SUMMARY
MCV 95.4 94.1  --  93.9   MCH 30.6 31.1  --  30.3   MCHC 32.1 33.0  --  32.2   RDW 14.3 15.0*  --  16.0*    270  --  291   MPV 9.6 9.5  --  9.4   NRBC 0.00 0.00  --  0.00   LYMPHOPCT 4* 15  --  15   MONOPCT 0* 9  --  12   BASOPCT 0 0  --  0   IMMGRAN 1 1  --  1   LYMPHSABS 0.4* 1.1  --  1.0   EOSABS 0.0 0.1  --  0.2   MONOSABS 0.0* 0.7  --  0.8   BASOSABS 0.0 0.0  --  0.0   ABSIMMGRAN 0.1 0.0  --  0.0      LFT Recent Labs     10/21/24  2103   BILITOT <0.2   ALKPHOS 43   AST 19   ALT 8   GLOB 2.7      Cardiac  No results found for: \"NTPROBNP\", \"TROPHS\"   Coags Lab Results   Component Value Date/Time    PROTIME 14.8 10/21/2024 10:18 PM    INR 1.2 10/21/2024 10:18 PM      A1c No results found for: \"LABA1C\", \"EAG\"   Lipids No results found for: \"CHOL\", \"HDL\", \"CHOLHDLRATIO\", \"TRIG\"   Thyroid  No results found for: \"TSHELE\", \"LLU0ZVP\"     Most Recent UA Lab Results   Component Value Date/Time    COLORU YELLOW/STRAW 10/10/2024 01:42 PM    APPEARANCE CLEAR 10/10/2024 01:42 PM    PROTEINU Negative 10/10/2024 01:42 PM    GLUCOSEU Negative 10/10/2024 01:42 PM    KETUA Negative 10/10/2024 01:42 PM    BILIRUBINUR Negative 10/10/2024 01:42 PM    BLOODU Negative 10/10/2024 01:42 PM    UROBILINOGEN 0.2 10/10/2024 01:42 PM    NITRU Negative 10/10/2024 01:42 PM    LEUKOCYTESUR Negative 10/10/2024 01:42 PM        Microbiology:  Results       Procedure Component Value Units Date/Time    COVID-19, Rapid [[id number]] Collected: 10/21/24 2103    Order Status: Completed Specimen: Nasopharyngeal Updated: 10/21/24 2136     Source NASAL SWAB        SARS-CoV-2, Rapid Not detected        Comment:    The specimen is NEGATIVE for SARS-CoV-2, the novel coronavirus associated with COVID-19.  A negative result does not rule out COVID-19.     This test has been authorized by the FDA under an Emergency Use Authorization (EUA) for use by authorized laboratories.      Fact sheet for Healthcare Providers:

## 2024-10-24 NOTE — PROGRESS NOTES
Hospitalist Progress Note   Admit Date:  10/21/2024  7:13 PM   Name:  Madai Tinoco   Age:  79 y.o.  Sex:  female  :  1945   MRN:  [id number]   Room:  363/    Presenting/Chief Complaint: Shortness of Breath     Reason(s) for Admission: GI bleed [K92.2]  Gastrointestinal hemorrhage, unspecified gastrointestinal hemorrhage type [K92.2]     Hospital Course:   Madai Tinoco is a 79 y.o. female with medical history of   Emphysema  Hypertension  Atrial fibrillation on Eliquis  Obstructive sleep apnea     who presented with shortness of breath and fatigue.     In the emergency room he BNP is high, hemoglobin is 6.6.   Chest x-ray shows interstitial edema pattern    GI is consulted due to the fact the patient is on Eliquis and with low hemoglobin.   Eliquis is on hold.   Patient received blood transfusion      Subjective & 24hr Events:     10/22/24   Patient is seen and examined at bedside.    Afebrile.   No abdominal pain.   No passing blood per rectum since admission.   She reports having seen blood when she wiped her self.     10/23/24   Patient underwent colonoscopy today.   Findings showed hematin versus food debris in the entire colon.   Diverticulum was found.   The exam was otherwise without abnormality on direct and retroflexion views.   Plan is to perform video capsule endoscopy later.   GI service recommended to consider monotherapy with aspirin 81 mg and discontinuing Eliquis indefinitely given risk for bleeding.   They recommend CBC periodically in 1 week postdischarge.     Assessment & Plan:     Principal Problem:    GI bleed    Acute blood loss anemia   Plan:   Patient received blood transfusion.   On Pantoprazole IV   Hold Apixaban.   Hold antiplatelet   I will give 1 unit of PRC today.   We will need to discuss with cardiology for stopping Eliquis indefinitely.     Active Problems:    Bladder cancer (HCC)  Plan:   This adds to complexity of care.        Essential 
4 Eyes Skin Assessment     NAME:  Madai Tinoco  YOB: 1945  MEDICAL RECORD NUMBER:  [id number]    The patient is being assessed for  Admission    I agree that at least one RN has performed a thorough Head to Toe Skin Assessment on the patient. ALL assessment sites listed below have been assessed.      Areas assessed by both nurses:    Head, Face, Ears, Shoulders, Back, Chest, Arms, Elbows, Hands, Sacrum. Buttock, Coccyx, Ischium, Legs. Feet and Heels, and Under Medical Devices         Does the Patient have a Wound? No noted wound(s)       Reynaldo Prevention initiated by RN: No  Wound Care Orders initiated by RN: No    Pressure Injury (Stage 3,4, Unstageable, DTI, NWPT, and Complex wounds) if present, place Wound referral order by RN under : No    New Ostomies, if present place, Ostomy referral order under : No     Nurse 1 eSignature: Electronically signed by Dav Simons RN on 10/22/24 at 12:45 AM EDT    **SHARE this note so that the co-signing nurse can place an eSignature**    Nurse 2 eSignature: Electronically signed by HERMES LOPEZ RN on 10/22/24 at 12:30 AM EDT    
Blood transfusion rate verified with VONDA Valentin. Rate 250ml/hr.    
Discharge instructions were reviewed with patient. An opportunity was given for questions. All medications were reviewed, and information was given on the new medications. Patient verbalized understanding, and has no questions at this time. IV and heart monitor removed.  
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     
TRANSFER - IN REPORT:    Verbal report received from VONDA Valentin on Madai Tinoco  [place] [facility] [facility] [place] care      Report consisted of patient's Situation, Background, Assessment and   Recommendations(SBAR).     Information from the following report(s) Nurse Handoff Report, ED Encounter Summary, ED SBAR, Adult Overview, MAR, Recent Results, Cardiac Rhythm  , Alarm Parameters, Procedure Verification, Quality Measures, and Neuro Assessment was reviewed with the receiving nurse.    Opportunity for questions and clarification was provided.      Assessment completed upon patient's arrival to unit and care assumed.     
TRANSFER - OUT REPORT:    Verbal report given to Brenna RN on Madai Tinoco  being transferred to [facility] [place] [facility] for routine progression of patient care    Report consisted of patient's Situation, Background, Assessment and   Recommendations(SBAR).     Information from the following report(s) Nurse Handoff Report, Adult Overview, Intake/Output, Recent Results, Pre Procedure Checklist, Quality Measures, and Neuro Assessment was reviewed with the receiving nurse.           Lines:   Peripheral IV 10/21/24 Left Antecubital (Active)   Site Assessment Clean, dry & intact 10/22/24 1850   Line Status Capped;Normal saline locked;Flushed 10/22/24 1850   Line Care Connections checked and tightened;Cap changed 10/22/24 1850   Phlebitis Assessment No symptoms 10/22/24 1850   Infiltration Assessment 0 10/22/24 1850   Alcohol Cap Used Yes 10/22/24 1850   Dressing Status Clean, dry & intact 10/22/24 1850   Dressing Type Transparent 10/22/24 1850        Opportunity for questions and clarification was provided.      Patient to be transported.        
mg/dL    BUN 59 (H) 8 - 23 MG/DL    Creatinine 1.32 (H) 0.60 - 1.10 MG/DL    Est, Glom Filt Rate 42 (L) >60 ml/min/1.73m2    Calcium 9.0 8.8 - 10.2 MG/DL    Total Bilirubin <0.2 0.0 - 1.2 MG/DL    ALT 8 8 - 45 U/L    AST 19 15 - 37 U/L    Alk Phosphatase 43 35 - 104 U/L    Total Protein 5.9 (L) 6.3 - 8.2 g/dL    Albumin 3.2 3.2 - 4.6 g/dL    Globulin 2.7 2.3 - 3.5 g/dL    Albumin/Globulin Ratio 1.2 1.0 - 1.9     Magnesium    Collection Time: 10/21/24  9:03 PM   Result Value Ref Range    Magnesium 2.4 1.8 - 2.4 mg/dL   Brain Natriuretic Peptide    Collection Time: 10/21/24  9:03 PM   Result Value Ref Range    NT Pro-BNP 1,801 (H) 0 - 450 PG/ML   Troponin    Collection Time: 10/21/24  9:03 PM   Result Value Ref Range    Troponin T 64.0 (H) 0 - 14 ng/L   COVID-19, Rapid    Collection Time: 10/21/24  9:03 PM    Specimen: Nasopharyngeal   Result Value Ref Range    Source NASAL SWAB      SARS-CoV-2, Rapid Not detected NOTD     High sensitivity CRP    Collection Time: 10/21/24  9:03 PM   Result Value Ref Range    CRP, High Sensitivity 1.3 0.0 - 3.0 mg/L   Procalcitonin    Collection Time: 10/21/24  9:03 PM   Result Value Ref Range    Procalcitonin 0.06 0.00 - 0.10 ng/mL   EKG 12 Lead    Collection Time: 10/21/24  9:10 PM   Result Value Ref Range    Ventricular Rate 90 BPM    Atrial Rate 89 BPM    P-R Interval 211 ms    QRS Duration 128 ms    Q-T Interval 418 ms    QTc Calculation (Bazett) 512 ms    P Axis 82 degrees    R Axis -29 degrees    T Axis 75 degrees    Diagnosis       Sinus rhythm  Nonspecific intraventricular conduction delay  Minimal ST depression, anterior leads     PREPARE RBC (CROSSMATCH), 1 Units    Collection Time: 10/21/24  9:30 PM   Result Value Ref Range    History Check Historical check performed    TYPE AND SCREEN    Collection Time: 10/21/24  9:56 PM   Result Value Ref Range    Crossmatch expiration date 10/24/2024,0576     ABO/Rh A POSITIVE     Antibody Screen NEG     Blood Bank Comment       CALLED

## 2024-10-24 NOTE — CARE COORDINATION
Patient with discharge order for today. Patient discharging home with family support. Patient has met all treatment goals and milestones for discharge. Patient/family in agreement with discharge plan. Family to provide transportation home. CM following until patient is discharged.        10/24/24 1141   Discharge Planning   Type of Residence House   Living Arrangements Spouse/Significant Other   Current Services Prior To Admission Durable Medical Equipment   Current DME Prior to Arrival Oxygen Therapy (Comment)   Potential Assistance Needed Home Care   DME Ordered? No   Potential Assistance Purchasing Medications No   Type of Home Care Services None   Patient expects to be discharged to: House   Services At/After Discharge   Transition of Care Consult (CM Consult) N/A   Services At/After Discharge None   [place] Resource Information Provided? No   Mode of Transport at Discharge Self   Confirm Follow Up Transport Family   Condition of Participation: Discharge Planning   The Plan for Transition of Care is related to the following treatment goals: Anticipate routine DC with OP FU and support of family when medically stable   The Patient and/or Patient Representative was provided with a Choice of Provider? Patient   The Patient and/Or Patient Representative agree with the Discharge Plan? Yes   Freedom of Choice list was provided with basic dialogue that supports the patient's individualized plan of care/goals, treatment preferences, and shares the quality data associated with the providers?  Yes

## 2024-10-24 NOTE — PLAN OF CARE
Problem: Discharge Planning  Goal: Discharge to home or other facility with appropriate resources  Outcome: Adequate for Discharge     Problem: ABCDS Injury Assessment  Goal: Absence of physical injury  Outcome: Adequate for Discharge     Problem: Safety - Adult  Goal: Free from fall injury  Outcome: Adequate for Discharge     Problem: Skin/Tissue Integrity  Goal: Absence of new skin breakdown  Description: 1.  Monitor for areas of redness and/or skin breakdown  2.  Assess vascular access sites hourly  3.  Every 4-6 hours minimum:  Change oxygen saturation probe site  4.  Every 4-6 hours:  If on nasal continuous positive airway pressure, respiratory therapy assess nares and determine need for appliance change or resting period.  Outcome: Adequate for Discharge     Problem: Pain  Goal: Verbalizes/displays adequate comfort level or baseline comfort level  Outcome: Adequate for Discharge

## 2024-11-04 ENCOUNTER — TELEPHONE (OUTPATIENT)
Dept: GASTROENTEROLOGY | Age: 79
End: 2024-11-04

## 2024-11-04 NOTE — TELEPHONE ENCOUNTER
Patient arrived as instructed for pillcam study. Pt verbalized adherence to clear, odorless liquid diet (no milk/ dairy, alcohol) since after lunchtime yesterday/ NPO since 10:00 pm last night. Only necessary medications taken 10 hours prior to procedure with a sip of water. No medications taken 2 hours prior to swallowing pillcam. She arrived wearing thin, natural fiber upper garment long enough to reach at least to the hip level and not ride up above the belt. Sensor belt placed on patient over a single layer of clothing. Placed the shoulder strap across the left shoulder. Adjusted shoulder and belt straps to fit. Sensor connected to the recorder 3 and placed into pouch. Pillcam SB3 capsule box opened and paired with recorder. Touching the non clear part of the pillcam, patient removed the pillcam and swallowed capsule with a sip of water. Patient instructed that she may drink clear liquids 2 hours after capsule is ingested and may have a light snack 4 hours after capsule is ingested. Patient advised to remain in the upright position at all times. Instructed to return to office today by 4:15 pm to return equipment.

## (undated) DEVICE — ENDOSCOPIC KIT 1.1+ OP4 CA DE 2 GWN AAMI LEVEL 3

## (undated) DEVICE — KENDALL RADIOLUCENT FOAM MONITORING ELECTRODE RECTANGULAR SHAPE: Brand: KENDALL

## (undated) DEVICE — YANKAUER,BULB TIP,W/O VENT,RIGID,STERILE: Brand: MEDLINE

## (undated) DEVICE — SYRINGE MEDICAL 3ML CLEAR PLASTIC STANDARD NON CONTROL LUERLOCK TIP DISPOSABLE

## (undated) DEVICE — NEEDLE SYRINGE 18GA L1.5IN RED PLAS HUB S STL BLNT FILL W/O

## (undated) DEVICE — CONNECTOR TBNG OD5-7MM O2 END DISP

## (undated) DEVICE — SYRINGE MED 10ML LUERLOCK TIP W/O SFTY DISP

## (undated) DEVICE — SINGLE PORT MANIFOLD: Brand: NEPTUNE 2

## (undated) DEVICE — GAUZE,SPONGE,4"X4",12PLY,WOVEN,NS,LF: Brand: MEDLINE

## (undated) DEVICE — AIRLIFE™ OXYGEN TUBING 7 FEET (2.1 M) CRUSH RESISTANT OXYGEN TUBING, VINYL TIPPED: Brand: AIRLIFE™

## (undated) DEVICE — CANNULA NSL ORAL AD FOR CAPNOFLEX CO2 O2 AIRLFE